# Patient Record
Sex: FEMALE | Race: BLACK OR AFRICAN AMERICAN | NOT HISPANIC OR LATINO | Employment: FULL TIME | URBAN - METROPOLITAN AREA
[De-identification: names, ages, dates, MRNs, and addresses within clinical notes are randomized per-mention and may not be internally consistent; named-entity substitution may affect disease eponyms.]

---

## 2024-04-06 ENCOUNTER — HOSPITAL ENCOUNTER (EMERGENCY)
Facility: HOSPITAL | Age: 34
Discharge: HOME | End: 2024-04-06
Attending: EMERGENCY MEDICINE
Payer: MEDICAID

## 2024-04-06 ENCOUNTER — APPOINTMENT (OUTPATIENT)
Dept: CARDIOLOGY | Facility: HOSPITAL | Age: 34
End: 2024-04-06
Payer: MEDICAID

## 2024-04-06 VITALS
SYSTOLIC BLOOD PRESSURE: 113 MMHG | DIASTOLIC BLOOD PRESSURE: 65 MMHG | HEART RATE: 85 BPM | TEMPERATURE: 98.4 F | RESPIRATION RATE: 16 BRPM | OXYGEN SATURATION: 99 %

## 2024-04-06 DIAGNOSIS — I89.0 LYMPHEDEMA: ICD-10-CM

## 2024-04-06 DIAGNOSIS — M79.652 LEFT THIGH PAIN: Primary | ICD-10-CM

## 2024-04-06 LAB
ANION GAP SERPL CALCULATED.3IONS-SCNC: 11.5 MMOL/L (ref 5–15)
BASOPHILS # BLD AUTO: 0.07 10*3/MM3 (ref 0–0.2)
BASOPHILS NFR BLD AUTO: 0.6 % (ref 0–1.5)
BH CV LOWER VASCULAR LEFT COMMON FEMORAL AUGMENT: NORMAL
BH CV LOWER VASCULAR LEFT COMMON FEMORAL COMPETENT: NORMAL
BH CV LOWER VASCULAR LEFT COMMON FEMORAL COMPRESS: NORMAL
BH CV LOWER VASCULAR LEFT COMMON FEMORAL PHASIC: NORMAL
BH CV LOWER VASCULAR LEFT COMMON FEMORAL SPONT: NORMAL
BH CV LOWER VASCULAR LEFT DISTAL FEMORAL COMPRESS: NORMAL
BH CV LOWER VASCULAR LEFT GASTRONEMIUS COMPRESS: NORMAL
BH CV LOWER VASCULAR LEFT GREATER SAPH AK COMPRESS: NORMAL
BH CV LOWER VASCULAR LEFT GREATER SAPH BK COMPRESS: NORMAL
BH CV LOWER VASCULAR LEFT LESSER SAPH COMPRESS: NORMAL
BH CV LOWER VASCULAR LEFT MID FEMORAL AUGMENT: NORMAL
BH CV LOWER VASCULAR LEFT MID FEMORAL COMPETENT: NORMAL
BH CV LOWER VASCULAR LEFT MID FEMORAL COMPRESS: NORMAL
BH CV LOWER VASCULAR LEFT MID FEMORAL PHASIC: NORMAL
BH CV LOWER VASCULAR LEFT MID FEMORAL SPONT: NORMAL
BH CV LOWER VASCULAR LEFT PERONEAL COMPRESS: NORMAL
BH CV LOWER VASCULAR LEFT POPLITEAL AUGMENT: NORMAL
BH CV LOWER VASCULAR LEFT POPLITEAL COMPETENT: NORMAL
BH CV LOWER VASCULAR LEFT POPLITEAL COMPRESS: NORMAL
BH CV LOWER VASCULAR LEFT POPLITEAL PHASIC: NORMAL
BH CV LOWER VASCULAR LEFT POPLITEAL SPONT: NORMAL
BH CV LOWER VASCULAR LEFT POSTERIOR TIBIAL COMPRESS: NORMAL
BH CV LOWER VASCULAR LEFT PROFUNDA FEMORAL COMPRESS: NORMAL
BH CV LOWER VASCULAR LEFT PROXIMAL FEMORAL COMPRESS: NORMAL
BH CV LOWER VASCULAR LEFT SAPHENOFEMORAL JUNCTION COMPRESS: NORMAL
BH CV LOWER VASCULAR RIGHT COMMON FEMORAL AUGMENT: NORMAL
BH CV LOWER VASCULAR RIGHT COMMON FEMORAL COMPETENT: NORMAL
BH CV LOWER VASCULAR RIGHT COMMON FEMORAL COMPRESS: NORMAL
BH CV LOWER VASCULAR RIGHT COMMON FEMORAL PHASIC: NORMAL
BH CV LOWER VASCULAR RIGHT COMMON FEMORAL SPONT: NORMAL
BH CV VAS PRELIMINARY FINDINGS SCRIPTING: 1
BUN SERPL-MCNC: 13 MG/DL (ref 6–20)
BUN/CREAT SERPL: 14.9 (ref 7–25)
CALCIUM SPEC-SCNC: 9 MG/DL (ref 8.6–10.5)
CHLORIDE SERPL-SCNC: 104 MMOL/L (ref 98–107)
CO2 SERPL-SCNC: 27.5 MMOL/L (ref 22–29)
CREAT SERPL-MCNC: 0.87 MG/DL (ref 0.57–1)
DEPRECATED RDW RBC AUTO: 42.2 FL (ref 37–54)
EGFRCR SERPLBLD CKD-EPI 2021: 89.8 ML/MIN/1.73
EOSINOPHIL # BLD AUTO: 0.34 10*3/MM3 (ref 0–0.4)
EOSINOPHIL NFR BLD AUTO: 2.9 % (ref 0.3–6.2)
ERYTHROCYTE [DISTWIDTH] IN BLOOD BY AUTOMATED COUNT: 14.2 % (ref 12.3–15.4)
GLUCOSE SERPL-MCNC: 180 MG/DL (ref 65–99)
HCT VFR BLD AUTO: 37.8 % (ref 34–46.6)
HGB BLD-MCNC: 11.8 G/DL (ref 12–15.9)
IMM GRANULOCYTES # BLD AUTO: 0.02 10*3/MM3 (ref 0–0.05)
IMM GRANULOCYTES NFR BLD AUTO: 0.2 % (ref 0–0.5)
LYMPHOCYTES # BLD AUTO: 5.08 10*3/MM3 (ref 0.7–3.1)
LYMPHOCYTES NFR BLD AUTO: 43.7 % (ref 19.6–45.3)
MAGNESIUM SERPL-MCNC: 2.1 MG/DL (ref 1.6–2.6)
MCH RBC QN AUTO: 25.8 PG (ref 26.6–33)
MCHC RBC AUTO-ENTMCNC: 31.2 G/DL (ref 31.5–35.7)
MCV RBC AUTO: 82.7 FL (ref 79–97)
MONOCYTES # BLD AUTO: 0.73 10*3/MM3 (ref 0.1–0.9)
MONOCYTES NFR BLD AUTO: 6.3 % (ref 5–12)
NEUTROPHILS NFR BLD AUTO: 46.3 % (ref 42.7–76)
NEUTROPHILS NFR BLD AUTO: 5.38 10*3/MM3 (ref 1.7–7)
NRBC BLD AUTO-RTO: 0 /100 WBC (ref 0–0.2)
PLATELET # BLD AUTO: 344 10*3/MM3 (ref 140–450)
PMV BLD AUTO: 10 FL (ref 6–12)
POTASSIUM SERPL-SCNC: 3.9 MMOL/L (ref 3.5–5.2)
RBC # BLD AUTO: 4.57 10*6/MM3 (ref 3.77–5.28)
SODIUM SERPL-SCNC: 143 MMOL/L (ref 136–145)
WBC NRBC COR # BLD AUTO: 11.62 10*3/MM3 (ref 3.4–10.8)

## 2024-04-06 PROCEDURE — 36415 COLL VENOUS BLD VENIPUNCTURE: CPT

## 2024-04-06 PROCEDURE — 80048 BASIC METABOLIC PNL TOTAL CA: CPT | Performed by: PHYSICIAN ASSISTANT

## 2024-04-06 PROCEDURE — 93971 EXTREMITY STUDY: CPT

## 2024-04-06 PROCEDURE — 83735 ASSAY OF MAGNESIUM: CPT | Performed by: PHYSICIAN ASSISTANT

## 2024-04-06 PROCEDURE — 93971 EXTREMITY STUDY: CPT | Performed by: SURGERY

## 2024-04-06 PROCEDURE — 85025 COMPLETE CBC W/AUTO DIFF WBC: CPT | Performed by: PHYSICIAN ASSISTANT

## 2024-04-06 PROCEDURE — 99284 EMERGENCY DEPT VISIT MOD MDM: CPT

## 2024-04-06 RX ORDER — ACETAMINOPHEN 500 MG
1000 TABLET ORAL ONCE
Status: COMPLETED | OUTPATIENT
Start: 2024-04-06 | End: 2024-04-06

## 2024-04-06 RX ADMIN — ACETAMINOPHEN 1000 MG: 500 TABLET ORAL at 09:43

## 2024-04-06 NOTE — ED PROVIDER NOTES
EMERGENCY DEPARTMENT ENCOUNTER  Room Number:  01/01  PCP: Ramirez Auguste MD  Independent Historians: Patient      HPI:  Chief Complaint: had concerns including Leg Pain.     A complete HPI/ROS/PMH/PSH/SH/FH are unobtainable due to: None    Chronic or social conditions impacting patient care (Social Determinants of Health): None      Context: Amberly Sanders is a 33 y.o. female with a medical history of lymphedema and obesity who presents to the ED c/o acute left thigh pain.  Patient reports for the last 3 days she has had cramping pain over the lateral left thigh.  States she has seen Dr. Govea with vascular surgery and told she has had superficial thrombophlebitis.  No history of DVT.  No injury or trauma to the leg.  Patient denies dyspnea, chest pain, right leg symptoms.  No other systemic complaints at this time.      Review of prior external notes (non-ED) -and- Review of prior external test results outside of this encounter: Extensive review of the EPIC system as well as [facility] reveals no prior visit notes and no prior diagnostic studies available for review.    Prescription drug monitoring program review:     N/A    PAST MEDICAL HISTORY  Active Ambulatory Problems     Diagnosis Date Noted    No Active Ambulatory Problems     Resolved Ambulatory Problems     Diagnosis Date Noted    No Resolved Ambulatory Problems     No Additional Past Medical History         PAST SURGICAL HISTORY  History reviewed. No pertinent surgical history.      FAMILY HISTORY  History reviewed. No pertinent family history.      SOCIAL HISTORY  Social History     Socioeconomic History    Marital status: Single         ALLERGIES  Sulfa antibiotics      REVIEW OF SYSTEMS  Review of Systems   Constitutional:  Negative for chills and fever.   HENT:  Negative for ear pain and sore throat.    Respiratory:  Negative for cough and shortness of breath.    Cardiovascular:  Negative for chest pain and palpitations.    Gastrointestinal:  Negative for abdominal pain and vomiting.   Genitourinary:  Negative for dysuria and hematuria.   Musculoskeletal:  Positive for myalgias. Negative for arthralgias and joint swelling.   Skin:  Negative for pallor and rash.   Neurological:  Negative for numbness and headaches.   Psychiatric/Behavioral:  Negative for confusion and hallucinations.      Included in HPI  All systems reviewed and negative except for those discussed in HPI.      PHYSICAL EXAM    I have reviewed the triage vital signs and nursing notes.    ED Triage Vitals [04/06/24 0718]   Temp Heart Rate Resp BP SpO2   98.4 °F (36.9 °C) 112 16 -- 97 %      Temp src Heart Rate Source Patient Position BP Location FiO2 (%)   Tympanic Monitor -- -- --       Physical Exam  Constitutional:       General: She is not in acute distress.     Appearance: She is well-developed.   HENT:      Head: Normocephalic and atraumatic.   Eyes:      Extraocular Movements: Extraocular movements intact.   Cardiovascular:      Rate and Rhythm: Normal rate and regular rhythm.      Pulses:           Dorsalis pedis pulses are 2+ on the right side and 2+ on the left side.      Heart sounds: Normal heart sounds.   Pulmonary:      Effort: Pulmonary effort is normal.      Breath sounds: Normal breath sounds.   Abdominal:      General: There is no distension.   Musculoskeletal:      Comments: Patient has some tenderness along the distal iliotibial band of the left leg.  Very mild calf tenderness.  No crepitus.   Skin:     General: Skin is warm.   Neurological:      General: No focal deficit present.      Mental Status: She is alert and oriented to person, place, and time.   Psychiatric:         Mood and Affect: Mood normal.           LAB RESULTS  Recent Results (from the past 24 hour(s))   Basic Metabolic Panel    Collection Time: 04/06/24  7:33 AM    Specimen: Blood   Result Value Ref Range    Glucose 180 (H) 65 - 99 mg/dL    BUN 13 6 - 20 mg/dL    Creatinine 0.87  0.57 - 1.00 mg/dL    Sodium 143 136 - 145 mmol/L    Potassium 3.9 3.5 - 5.2 mmol/L    Chloride 104 98 - 107 mmol/L    CO2 27.5 22.0 - 29.0 mmol/L    Calcium 9.0 8.6 - 10.5 mg/dL    BUN/Creatinine Ratio 14.9 7.0 - 25.0    Anion Gap 11.5 5.0 - 15.0 mmol/L    eGFR 89.8 >60.0 mL/min/1.73   Magnesium    Collection Time: 04/06/24  7:33 AM    Specimen: Blood   Result Value Ref Range    Magnesium 2.1 1.6 - 2.6 mg/dL   CBC Auto Differential    Collection Time: 04/06/24  7:33 AM    Specimen: Blood   Result Value Ref Range    WBC 11.62 (H) 3.40 - 10.80 10*3/mm3    RBC 4.57 3.77 - 5.28 10*6/mm3    Hemoglobin 11.8 (L) 12.0 - 15.9 g/dL    Hematocrit 37.8 34.0 - 46.6 %    MCV 82.7 79.0 - 97.0 fL    MCH 25.8 (L) 26.6 - 33.0 pg    MCHC 31.2 (L) 31.5 - 35.7 g/dL    RDW 14.2 12.3 - 15.4 %    RDW-SD 42.2 37.0 - 54.0 fl    MPV 10.0 6.0 - 12.0 fL    Platelets 344 140 - 450 10*3/mm3    Neutrophil % 46.3 42.7 - 76.0 %    Lymphocyte % 43.7 19.6 - 45.3 %    Monocyte % 6.3 5.0 - 12.0 %    Eosinophil % 2.9 0.3 - 6.2 %    Basophil % 0.6 0.0 - 1.5 %    Immature Grans % 0.2 0.0 - 0.5 %    Neutrophils, Absolute 5.38 1.70 - 7.00 10*3/mm3    Lymphocytes, Absolute 5.08 (H) 0.70 - 3.10 10*3/mm3    Monocytes, Absolute 0.73 0.10 - 0.90 10*3/mm3    Eosinophils, Absolute 0.34 0.00 - 0.40 10*3/mm3    Basophils, Absolute 0.07 0.00 - 0.20 10*3/mm3    Immature Grans, Absolute 0.02 0.00 - 0.05 10*3/mm3    nRBC 0.0 0.0 - 0.2 /100 WBC   Duplex Venous Lower Extremity - LEFT    Collection Time: 04/06/24  9:28 AM   Result Value Ref Range    Right Common Femoral Spont Y     Right Common Femoral Competent Y     Right Common Femoral Phasic Y     Right Common Femoral Compress C     Right Common Femoral Augment Y     Left Common Femoral Spont Y     Left Common Femoral Competent Y     Left Common Femoral Phasic Y     Left Common Femoral Compress C     Left Common Femoral Augment Y     Left Saphenofemoral Junction Compress C     Left Profunda Femoral Compress C     Left  Proximal Femoral Compress C     Left Mid Femoral Spont Y     Left Mid Femoral Competent Y     Left Mid Femoral Phasic Y     Left Mid Femoral Compress C     Left Mid Femoral Augment Y     Left Distal Femoral Compress C     Left Popliteal Spont Y     Left Popliteal Competent Y     Left Popliteal Phasic Y     Left Popliteal Compress C     Left Popliteal Augment Y     Left Posterior Tibial Compress C     Left Peroneal Compress C     Left Gastronemius Compress C     Left Greater Saph AK Compress C     Left Greater Saph BK Compress C     Left Lesser Saph Compress C     BH CV VAS PRELIMINARY FINDINGS SCRIPTING 1.0          MEDICATIONS GIVEN IN ER  Medications   acetaminophen (TYLENOL) tablet 1,000 mg (1,000 mg Oral Given 4/6/24 0943)         ORDERS PLACED DURING THIS VISIT:  Orders Placed This Encounter   Procedures    Basic Metabolic Panel    Magnesium    CBC Auto Differential    CBC & Differential         OUTPATIENT MEDICATION MANAGEMENT:  No current Epic-ordered facility-administered medications on file.     Current Outpatient Medications Ordered in Epic   Medication Sig Dispense Refill    Diclofenac Sodium (VOLTAREN) 1 % gel gel Apply 4 g topically to the appropriate area as directed 4 (Four) Times a Day As Needed (Pain). 100 g 0             PROGRESS, DATA ANALYSIS, CONSULTS, AND MEDICAL DECISION MAKING  All labs have been independently interpreted by me.  All radiology studies have been reviewed by me. All EKG's have been independently viewed and interpreted by me.  Discussion below represents my analysis of pertinent findings related to patient's condition, differential diagnosis, treatment plan and final disposition.    Differential diagnosis includes but is not limited to DVT, popliteal cyst, superficial thrombophlebitis, IT band syndrome.        ED Course as of 04/06/24 1013   Sat Apr 06, 2024   0816 Glucose(!): 180 [WH]   0817 BUN: 13 [WH]   0817 Creatinine: 0.87 [WH]   0817 Magnesium: 2.1 [WH]   0817 WBC(!):  11.62 []   0817 Hemoglobin(!): 11.8 []   0928 Preliminary report per ultrasound tech is negative for DVT in the LLE. [MP]   1012 Patient presents to emergency department with left thigh pain.  Worked up with labs and venous duplex of left lower extremity.  No evidence of DVT.  Suspect muscular etiology.  Patient given dose of Tylenol here in the ED.  Will have her follow-up with PCP and discussed ED return precautions.  She is otherwise well-appearing, hemodynamically stable, and therefore appropriate for discharge. [MP]      ED Course User Index  [MP] Lina Wall PA-C  [] Eduardo Jimenez MD             AS OF 10:12 EDT VITALS:    BP - 113/65  HR - 85  TEMP - 98.4 °F (36.9 °C) (Tympanic)  O2 SATS - 99%    COMPLEXITY OF CARE  Admission was considered but after careful review of the patient's presentation, physical examination, diagnostic results, and response to treatment the patient may be safely discharged with outpatient follow-up.      DIAGNOSIS  Final diagnoses:   Left thigh pain   Lymphedema         DISPOSITION  ED Disposition       ED Disposition   Discharge    Condition   Stable    Comment   --                Please note that portions of this document were completed with a voice recognition program.    Note Disclaimer: At [facility], we believe that sharing information builds trust and better relationships. You are receiving this note because you recently visited [facility]. It is possible you will see health information before a provider has talked with you about it. This kind of information can be easy to misunderstand. To help you fully understand what it means for your health, we urge you to discuss this note with your provider.         Lina Wall PA-C  04/06/24 1013

## 2024-04-06 NOTE — ED PROVIDER NOTES
MD ATTESTATION NOTE     SHARED VISIT: This visit was performed by BOTH a physician and an APC. The substantive portion of the medical decision making was performed by this attesting physician who made or approved the management plan and takes responsibility for patient management. All studies in the APC note (if performed) were independently interpreted by me.  The K[facility] and I have discussed this patient's history, physical exam, and treatment plan. I have reviewed the documentation and personally had a face to face interaction with the patient. I affirm the documentation and agree with the treatment and plan. I provided a substantive portion of the care of the patient.  I personally performed the physical exam in its entirety, and below are my findings.      Brief HPI: Patient complains of acute left calf and thigh pain for the past several days.  She has a history of lymphedema and has some chronic discomfort in her left calf..  She denies recent injury, chest pain, shortness of breath, or numbness/tingling/weakness in her extremities.    PHYSICAL EXAM  ED Triage Vitals   Temp Heart Rate Resp BP SpO2   04/06/24 0718 04/06/24 0718 04/06/24 0718 04/06/24 0727 04/06/24 0718   98.4 °F (36.9 °C) 112 16 128/88 97 %      Temp src Heart Rate Source Patient Position BP Location FiO2 (%)   04/06/24 0718 04/06/24 0718 04/06/24 0727 04/06/24 0727 --   Tympanic Monitor Sitting Right arm          GENERAL: Awake, alert, oriented x 3.  No acute distress  HENT: nares patent  EYES: no scleral icterus  CV: regular rhythm, normal rate, equal pedal pulses bilaterally  RESPIRATORY: normal effort, clear to auscultation bilaterally  ABDOMEN: soft, nontender  MUSCULOSKELETAL: There is tenderness along the lateral left thigh and left calf.  There is bilateral pedal edema.  NEURO: Normal strength and light touch sensation in both lower extremities.  PSYCH:  calm, cooperative  SKIN: warm, dry    Vital signs and nursing notes  reviewed.        Plan: Obtain labs and venous Doppler of the left leg.    Doppler ultrasound is negative for DVT.    Patient presented to the ED complaining of left leg pain.  Ultrasound was negative for DVT.  White blood cell count was mildly elevated but she did not have any evidence of cellulitis.  Pain is likely musculoskeletal.  Plan is for symptomatic treatment.    ED Course as of 04/06/24 1128   Sat Apr 06, 2024   0816 Glucose(!): 180 [WH]   0817 BUN: 13 [WH]   0817 Creatinine: 0.87 [WH]   0817 Magnesium: 2.1 [WH]   0817 WBC(!): 11.62 [WH]   0817 Hemoglobin(!): 11.8 [WH]   0928 Preliminary report per ultrasound tech is negative for DVT in the LLE. [MP]   1012 Patient presents to emergency department with left thigh pain.  Worked up with labs and venous duplex of left lower extremity.  No evidence of DVT.  Suspect muscular etiology.  Patient given dose of Tylenol here in the ED.  Will have her follow-up with PCP and discussed ED return precautions.  She is otherwise well-appearing, hemodynamically stable, and therefore appropriate for discharge. [MP]      ED Course User Index  [MP] Lina Wall PA-C  [] Eduardo Jimenez MD Holland, William D, MD  04/06/24 1129

## 2024-04-06 NOTE — DISCHARGE INSTRUCTIONS
Follow up with PCP.   Use tylenol or voltaren gel to help with pain.  Return to ED for any worsening symptoms.

## 2024-04-19 ENCOUNTER — HOSPITAL ENCOUNTER (EMERGENCY)
Facility: HOSPITAL | Age: 34
Discharge: HOME | End: 2024-04-19
Attending: EMERGENCY MEDICINE
Payer: COMMERCIAL

## 2024-04-19 ENCOUNTER — APPOINTMENT (OUTPATIENT)
Dept: CT IMAGING | Facility: HOSPITAL | Age: 34
End: 2024-04-19
Payer: COMMERCIAL

## 2024-04-19 VITALS
HEART RATE: 70 BPM | OXYGEN SATURATION: 96 % | TEMPERATURE: 98.3 F | SYSTOLIC BLOOD PRESSURE: 107 MMHG | RESPIRATION RATE: 20 BRPM | DIASTOLIC BLOOD PRESSURE: 67 MMHG

## 2024-04-19 DIAGNOSIS — M54.40 BACK PAIN OF LUMBAR REGION WITH SCIATICA: Primary | ICD-10-CM

## 2024-04-19 LAB — B-HCG UR QL: NEGATIVE

## 2024-04-19 PROCEDURE — 25010000002 KETOROLAC TROMETHAMINE PER 15 MG: Performed by: EMERGENCY MEDICINE

## 2024-04-19 PROCEDURE — 99284 EMERGENCY DEPT VISIT MOD MDM: CPT

## 2024-04-19 PROCEDURE — 72131 CT LUMBAR SPINE W/O DYE: CPT

## 2024-04-19 PROCEDURE — 96372 THER/PROPH/DIAG INJ SC/IM: CPT

## 2024-04-19 PROCEDURE — 81025 URINE PREGNANCY TEST: CPT | Performed by: EMERGENCY MEDICINE

## 2024-04-19 PROCEDURE — 63710000001 ONDANSETRON ODT 4 MG TABLET DISPERSIBLE: Performed by: EMERGENCY MEDICINE

## 2024-04-19 RX ORDER — METHOCARBAMOL 500 MG/1
500 TABLET, FILM COATED ORAL 2 TIMES DAILY PRN
Qty: 15 TABLET | Refills: 0 | Status: SHIPPED | OUTPATIENT
Start: 2024-04-19 | End: 2024-04-26

## 2024-04-19 RX ORDER — CYCLOBENZAPRINE HCL 10 MG
10 TABLET ORAL ONCE
Status: COMPLETED | OUTPATIENT
Start: 2024-04-19 | End: 2024-04-19

## 2024-04-19 RX ORDER — ONDANSETRON 4 MG/1
4 TABLET, ORALLY DISINTEGRATING ORAL ONCE
Status: COMPLETED | OUTPATIENT
Start: 2024-04-19 | End: 2024-04-19

## 2024-04-19 RX ORDER — MEDROXYPROGESTERONE ACETATE 10 MG/1
TABLET ORAL
COMMUNITY
Start: 2024-02-11

## 2024-04-19 RX ORDER — HYDROCHLOROTHIAZIDE 25 MG/1
TABLET ORAL
COMMUNITY
Start: 2024-01-11

## 2024-04-19 RX ORDER — KETOROLAC TROMETHAMINE 30 MG/ML
30 INJECTION, SOLUTION INTRAMUSCULAR; INTRAVENOUS ONCE
Status: COMPLETED | OUTPATIENT
Start: 2024-04-19 | End: 2024-04-19

## 2024-04-19 RX ORDER — ATORVASTATIN CALCIUM 20 MG/1
20 TABLET, FILM COATED ORAL NIGHTLY
COMMUNITY

## 2024-04-19 RX ORDER — LISINOPRIL 30 MG/1
TABLET ORAL
COMMUNITY
Start: 2024-01-21

## 2024-04-19 RX ORDER — ERGOCALCIFEROL 1.25 MG/1
CAPSULE ORAL
COMMUNITY
Start: 2024-02-11

## 2024-04-19 RX ORDER — KETOROLAC TROMETHAMINE 15 MG/ML
15 INJECTION, SOLUTION INTRAMUSCULAR; INTRAVENOUS ONCE
Status: DISCONTINUED | OUTPATIENT
Start: 2024-04-19 | End: 2024-04-19

## 2024-04-19 RX ORDER — METHYLPREDNISOLONE 4 MG/1
TABLET ORAL
Qty: 21 TABLET | Refills: 0 | Status: SHIPPED | OUTPATIENT
Start: 2024-04-19

## 2024-04-19 RX ORDER — PEN NEEDLE, DIABETIC 31 GX5/16"
NEEDLE, DISPOSABLE MISCELLANEOUS
COMMUNITY
Start: 2024-01-11

## 2024-04-19 RX ADMIN — CYCLOBENZAPRINE 10 MG: 10 TABLET, FILM COATED ORAL at 18:35

## 2024-04-19 RX ADMIN — KETOROLAC TROMETHAMINE 30 MG: 30 INJECTION, SOLUTION INTRAMUSCULAR at 18:35

## 2024-04-19 RX ADMIN — ONDANSETRON 4 MG: 4 TABLET, ORALLY DISINTEGRATING ORAL at 19:56

## 2024-04-19 NOTE — ED NOTES
Patient arrives via EMS from [facility] with back pain that radiates to right leg. 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

## 2024-04-19 NOTE — ED PROVIDER NOTES
EMERGENCY DEPARTMENT ENCOUNTER  Room Number:  35/35  PCP: Ramirez Auguste MD  Independent Historians: Patient and EMS      HPI:  Chief Complaint: had concerns including Back Pain and Leg Pain.     A complete HPI/ROS/PMH/PSH/SH/FH are unobtainable due to: None    Chronic or social conditions impacting patient care (Social Determinants of Health): None      Context: Amberly Sanders is a 33 y.o. 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  Yesterday she was helping a patient transfer out of bed and she had sudden strain in her low abdomen.  This morning when she woke up she had new pain in the low back that radiates into the right leg and has made it difficult for her to get up and walk or move around to do her usual daily activities.  She denies any bowel or bladder incontinence.  She denies any sensory changes.      Review of prior external notes (non-ED) -and- Review of prior external test results outside of this encounter: Extensive review of the EPIC system as well as [facility] reveals no prior visit notes and no prior diagnostic studies available for review.    PAST MEDICAL HISTORY  Active Ambulatory Problems     Diagnosis Date Noted    No Active Ambulatory Problems     Resolved Ambulatory Problems     Diagnosis Date Noted    No Resolved Ambulatory Problems     Past Medical History:   Diagnosis Date    Diabetes mellitus     Hyperlipidemia     Hypertension     Lymphedema     PCOS (polycystic ovarian syndrome)          PAST SURGICAL HISTORY  History reviewed. No pertinent surgical history.      FAMILY HISTORY  History reviewed. No pertinent family history.      SOCIAL HISTORY  Social History     Socioeconomic History    Marital status: Single   Tobacco Use    Smoking status: Never    Smokeless tobacco: Never   Substance and Sexual  "Activity    Alcohol use: Yes     Comment: \"a drink a week\"    Drug use: Never         ALLERGIES  Sulfa antibiotics      REVIEW OF SYSTEMS  Review of Systems  Included in HPI  All systems reviewed and negative except for those discussed in HPI.      PHYSICAL EXAM    I have reviewed the triage vital signs and nursing notes.    ED Triage Vitals [04/19/24 1725]   Temp Heart Rate Resp BP SpO2   98.3 °F (36.8 °C) 80 21 123/89 97 %      Temp src Heart Rate Source Patient Position BP Location FiO2 (%)   -- -- -- -- --       Physical Exam  GENERAL: alert, no acute distress  SKIN: Warm, dry  HENT: Normocephalic, atraumatic  EYES: no scleral icterus, normal conjunctiva  CV: regular rhythm, regular rate  RESPIRATORY: normal effort, lungs clear bilaterally  ABDOMEN: soft, nondistended, obese, mild tenderness across the lower quadrants equally but no peritoneal features elicited.  MUSCULOSKELETAL: no deformity, trace edema to the bilateral lower extremities symmetrically.  NEURO: alert, moves all extremities, follows commands, no focal motor or sensory deficits are observed.      LAB RESULTS  Recent Results (from the past 24 hour(s))   Pregnancy, Urine - Urine, Clean Catch    Collection Time: 04/19/24  7:16 PM    Specimen: Urine, Clean Catch   Result Value Ref Range    HCG, Urine QL Negative Negative         RADIOLOGY  CT Lumbar Spine Without Contrast    Result Date: 4/19/2024  CT SCAN OF THE LUMBAR SPINE WITHOUT CONTRAST  CLINICAL HISTORY: Low back pain.  TECHNIQUE: CT scan of the lumbar spine was obtained with a combination of 3 , 2, and 1 mm axial images. Bone and soft tissue algorithm images were obtained with sagittal and coronal reconstructed images.  FINDINGS:  There is A mild dextroconvex scoliotic curvature of the thoracolumbar junction.  The visualization of the spinal canal is markedly limited due to technical factors concerning patient body habitus.  At L1-2 and L2-3, there is no convincing significant canal or " foraminal stenosis.  At L3-4, there is a mild disc bulge in addition to a small left central disc protrusion mildly indenting the ventral subarachnoid space. There is no significant degree of foraminal compromise.  At L4-5, there is mild to moderate facet arthropathy. There is a disc bulge eccentric to the left. There is a question of a conjoined left L4 and L5 nerve root and the bulging disc material may contact the left L5 nerve root. Further evaluation with MR imaging is suggested.  At L5-S1, there are advanced degenerative disc changes. There is a focal osteophyte within the left subarticular region which contacts the descending left S1 nerve root sleeve.       The study is limited by technical concerns regarding patient body habitus.  At L5-S1, there is a relatively focal osteophyte within the left subarticular region which abuts the descending left S1 nerve root sleeve.  At the L4-5 level, there is a disc bulge eccentric to the left and this may abut the left L5 nerve root sleeve. Further characterization with MR imaging is suggested.  The remaining degenerative phenomena are as discussed above.  Mild dextroconvex scoliotic curvature of the thoracolumbar junction is seen.   Radiation dose reduction techniques were utilized, including automated exposure control and exposure modulation based on body size.  This report was finalized on 4/19/2024 9:21 PM by Dr. Cresencio Mendoza M.D on Workstation: [id number]         MEDICATIONS GIVEN IN ER  Medications   cyclobenzaprine (FLEXERIL) tablet 10 mg (10 mg Oral Given 4/19/24 1835)   ketorolac (TORADOL) injection 30 mg (30 mg Intramuscular Given 4/19/24 1835)   ondansetron ODT (ZOFRAN-ODT) disintegrating tablet 4 mg (4 mg Oral Given 4/19/24 1956)         ORDERS PLACED DURING THIS VISIT:  Orders Placed This Encounter   Procedures    CT Lumbar Spine Without Contrast    Pregnancy, Urine - Urine, Clean Catch    Ambulatory Referral to Neurosurgery (All except [place])          OUTPATIENT MEDICATION MANAGEMENT:  No current Epic-ordered facility-administered medications on file.     Current Outpatient Medications Ordered in Epic   Medication Sig Dispense Refill    atorvastatin (LIPITOR) 20 MG tablet Take 1 tablet by mouth Every Night.      B-D ULTRAFINE III SHORT PEN 31G X 8 MM misc       hydroCHLOROthiazide 25 MG tablet       lisinopril (PRINIVIL,ZESTRIL) 30 MG tablet       medroxyPROGESTERone (PROVERA) 10 MG tablet       Semaglutide,0.25 or 0.5MG/DOS, (OZEMPIC) 2 MG/1.5ML solution pen-injector Inject 0.5 mg under the skin into the appropriate area as directed 1 (One) Time Per Week.      vitamin D (ERGOCALCIFEROL) 1.25 MG (08500 UT) capsule capsule       Diclofenac Sodium (VOLTAREN) 1 % gel gel Apply 4 g topically to the appropriate area as directed 4 (Four) Times a Day As Needed (Pain). 100 g 0    methocarbamol (ROBAXIN) 500 MG tablet Take 1 tablet by mouth 2 (Two) Times a Day As Needed for Muscle Spasms for up to 7 days. 15 tablet 0    methylPREDNISolone (MEDROL) 4 MG dose pack Take as directed on package instructions. 21 tablet 0         PROCEDURES  Procedures            PROGRESS, DATA ANALYSIS, CONSULTS, AND MEDICAL DECISION MAKING  All labs have been independently interpreted by me.  All radiology studies have been reviewed by me. All EKG's have been independently viewed and interpreted by me.  Discussion below represents my analysis of pertinent findings related to patient's condition, differential diagnosis, treatment plan and final disposition.    Differential diagnosis includes but is not limited to lumbar spine fracture, disc herniation, lumbago, sciatica, abdominal strain.    Clinical Scores:                   ED Course as of 04/19/24 2337   Fri Apr 19, 2024 1815 Ordering CT lumbosacral spine for radiographic evaluation of her area of tenderness.  Suspect she may have some disc herniation component given the radicular symptoms described. [MAYDA]   2335 I independently  "interpreted the CT lumbar spine without contrast study and my findings are: No fracture, no subluxation [MAYDA]   8961 I discussed the CT findings with the patient at the bedside.  She is feeling a little better here after some anti-inflammatory muscle relaxer medication.  There are no red flags in her history or physical exam findings to suggest an acute neurosurgical emergency problem.  I think she is suitable for discharge home and outpatient follow-up with a spine specialist.  I explained that she may need to have an outpatient MRI of the lumbar spine for further evaluation of her condition.  She agrees to the plan as outlined.  Will give her resource information to contact the spine surgery office.  Will also discuss with her the usual \"return to ER\" instructions prior to discharge. [MAYDA]      ED Course User Index  [MAYDA] Arie Rubin MD           AS OF 23:37 EDT VITALS:    BP - 107/67  HR - 70  TEMP - 98.3 °F (36.8 °C)  O2 SATS - 96%    COMPLEXITY OF CARE  Admission was considered but after careful review of the patient's presentation, physical examination, diagnostic results, and response to treatment the patient may be safely discharged with outpatient follow-up.      DIAGNOSIS  Final diagnoses:   Back pain of lumbar region with sciatica         DISPOSITION  ED Disposition       ED Disposition   Discharge    Condition   Stable    Comment   --                Please note that portions of this document were completed with a voice recognition program.    Note Disclaimer: At [facility], we believe that sharing information builds trust and better relationships. You are receiving this note because you recently visited [facility]. It is possible you will see health information before a provider has talked with you about it. This kind of information can be easy to misunderstand. To help you fully understand what it means for your health, we urge you to discuss this note with your provider.         Arie Rubin " MD DAPHNIE  04/19/24 7046

## 2024-04-19 NOTE — Clinical Note
[facility] EMERGENCY DEPARTMENT  [address] [facility] [address]  Phone: [phone]    Amberly Sanders was seen and treated in our emergency department on 4/19/2024.  She may return to work on 04/23/2024.         Thank you for choosing [facility].    Arie Rubin MD

## 2024-04-20 NOTE — DISCHARGE INSTRUCTIONS
May apply heating pad to the low back and abdomen areas as needed.  Please return to the emergency room for worsening pain, fevers, weakness, numbness, bowel or bladder incontinence or any other concerns.  Recommend follow-up with spine surgery specialist for further testing as we discussed.

## 2024-04-23 ENCOUNTER — TRANSCRIBE ORDERS (OUTPATIENT)
Dept: PHYSICAL THERAPY | Facility: HOSPITAL | Age: 34
End: 2024-04-23
Payer: MEDICAID

## 2024-04-23 DIAGNOSIS — S33.5XXA SPRAIN OF LIGAMENTS OF LUMBAR SPINE, INITIAL ENCOUNTER: Primary | ICD-10-CM

## 2024-04-23 DIAGNOSIS — M54.41 RIGHT-SIDED LOW BACK PAIN WITH RIGHT-SIDED SCIATICA, UNSPECIFIED CHRONICITY: ICD-10-CM

## 2024-04-23 DIAGNOSIS — M54.42 LEFT-SIDED LOW BACK PAIN WITH LEFT-SIDED SCIATICA, UNSPECIFIED CHRONICITY: ICD-10-CM

## 2024-04-23 DIAGNOSIS — S33.6XXA SPRAIN OF SACROILIAC JOINT, INITIAL ENCOUNTER: ICD-10-CM
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
## 2024-04-23 DIAGNOSIS — M51.26 OTHER INTERVERTEBRAL DISC DISPLACEMENT, LUMBAR REGION: ICD-10-CM
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
## 2024-04-25 ENCOUNTER — HOSPITAL ENCOUNTER (OUTPATIENT)
Dept: PHYSICAL THERAPY | Facility: HOSPITAL | Age: 34
Setting detail: THERAPIES SERIES
Discharge: HOME | End: 2024-04-25
Payer: COMMERCIAL

## 2024-04-25 DIAGNOSIS — M54.41 ACUTE BILATERAL LOW BACK PAIN WITH BILATERAL SCIATICA: ICD-10-CM

## 2024-04-25 DIAGNOSIS — S39.012S LUMBAR STRAIN, SEQUELA: Primary | ICD-10-CM
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
## 2024-04-25 DIAGNOSIS — M54.42 ACUTE BILATERAL LOW BACK PAIN WITH BILATERAL SCIATICA: ICD-10-CM

## 2024-04-25 PROCEDURE — 97110 THERAPEUTIC EXERCISES: CPT

## 2024-04-25 PROCEDURE — 97163 PT EVAL HIGH COMPLEX 45 MIN: CPT

## 2024-04-25 NOTE — THERAPY EVALUATION
"  Outpatient Physical Therapy Ortho Initial Evaluation  [facility]     Patient Name: Amberly Sanders  : 1990  MRN: [id number]  Today's Date: 2024      Visit Date: 2024    There is no problem list on file for this patient.       Past Medical History:   Diagnosis Date    Diabetes mellitus     Hyperlipidemia     Hypertension     Lymphedema     PCOS (polycystic ovarian syndrome)         No past surgical history on file.    Visit Dx:     ICD-10-CM ICD-9-CM   1. Lumbar strain, sequela  S39.012S 905.7   2. Acute bilateral low back pain with bilateral sciatica  M54.42 724.2    M54.41 724.3   3. Injury caused by lifting, sequela  X50.0XXS 909.4          Patient History       Row Name 24 0700             History    Chief Complaint Pain  -DR      Type of Pain Back pain  -DR      Date Current Problem(s) Began 24  -      Brief Description of Current Complaint Amberly Sanders is a 33 y.o. female who presents to physical therapy today with primary compliant of acute lumbar strain that happened 24. VINICIUS being helping a patient transfer out of bed with assistance, orginially feeling it in abdomen, but now feeling it in lumbar region and radiates into R LE and upwards of entire back. She describes the pain as pulsating pressure, pulling pain; constant pain at 6/10. She went to ER day after injury, they completed a CT scan showing disc bulge eccentric to the L at L5 nerve root. She admits to bladder changes, stating she had an incontinence episode this AM and feels she has increased urge frequency. Reports a few times she has noticed \"tripping\" and L foot dragging intermittently when walking. No falls. Amberly Sanders reports difficulty/increased pain with sitting on hard surface, standing 10 min, sitting 15-30 minutes, bending. Pain relieving factors include repositioning, a more calm environment, steroids. 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 No turning at the waist and no bending to  at the waist while sitting. PMH includes HTN, diabetes, PCOS, HLD. Amberly Sanders primary goal for attending skilled physical therapy is to return to work and functional mobility with no pain.  -DR      Previous treatment for THIS PROBLEM Other (comment)  none  -DR      Patient/Caregiver Goals Relieve pain;Know what to do to help the symptoms  -DR      Hand Dominance right-handed  -DR      Occupation/sports/leisure activities RN at [employer]  -DR      What clinical tests have you had for this problem? CT scan  -DR      Results of Clinical Tests At L5-S1, there is a relatively focal osteophyte within the left  subarticular region which abuts the descending left S1 nerve root  sleeve. At the L4-5 level, there is a disc bulge eccentric to the left and this  may abut the left L5 nerve root sleeve.  -DR         Pain     Pain Location Back  -DR      Pain at Present 6  -DR      Pain at Best 4  -DR      Pain at Worst 10  -DR      Pain Frequency Constant/continuous  -DR      Pain Description Pressure;Other (Comment);Burning  pulsating pressure, pulling pain  -DR      What Performance Factors Make the Current Problem(s) WORSE? sitting on hard surface, standing 10 min, sitting 15-30 minutes, bending  -DR      What Performance Factors Make the Current Problem(s) BETTER? repositioning, a more calm environment, steroids, tylenol  -DR      Tolerance Time- Standing 10 min  -DR      Tolerance Time- Sitting 15-30 min  -DR      Is your sleep disturbed? Yes  at least 2x/night  -DR      What position do you sleep in? Right sidelying;Left sidelying;Prone;Supine  -DR      Difficulties at work? on modified duty  -DR      Difficulties with ADL's? toileting- bending is problematic  -DR         Fall Risk Assessment    Any falls in the past year: No  -DR         Services    Prior Rehab/Home Health Experiences No  -DR         Daily Activities    Primary Language  English  -DR      How does patient learn best? Demonstration;Pictures/Video;Listening;Reading  -DR      Barriers to learning None  -DR      Pt Participated in POC and Goals Yes  -DR         Safety    Are you being hurt, hit, or frightened by anyone at home or in your life? No  -DR      Are you being neglected by a caregiver No  -DR      Have you had any of the following issues with N/A  -DR                User Key  (r) = Recorded By, (t) = Taken By, (c) = Cosigned By      Initials Name Provider Type    Shoaib Giraldo, PT Physical Therapist                     PT Ortho       Row Name 04/25/24 0700       Posture/Observations    Alignment Options Forward head;Rounded shoulders;Lumbar lordosis  -DR    Forward Head Mild  -DR    Rounded Shoulders Mild  -DR    Lumbar lordosis Increased  -DR       Quarter Clearing    Quarter Clearing Lower Quarter Clearing  -DR       DTR- Lower Quarter Clearing    Patellar tendon (L2-4) Unable to assess  increased BMI limiting true assessment  -DR    Achilles tendon (S1-2) Unable to assess  increased BMI limiting true assessment  -DR       Neural Tension Signs- Lower Quarter Clearing    Slump Bilateral:;Positive  -DR    SLR Bilateral:;Positive  -DR       Sensory Screen for Light Touch- Lower Quarter Clearing    L1 (inguinal area) Bilateral:;Intact  -DR    L2 (anterior mid thigh) Bilateral:;Intact  -DR    L3 (distal anterior thigh) Right:;Diminished  -DR    L4 (medial lower leg/foot) Right:;Diminished  -DR    L5 (lateral lower leg/great toe) Right:;Diminished  -DR    S1 (bottom of foot) Right:;Diminished  -DR       Myotomal Screen- Lower Quarter Clearing    Hip flexion (L2) Left:;4- (Good -);Right:;4 (Good)  L minimal resistance caused shooting pains; R shooting to hip  -DR    Knee extension (L3) Bilateral:;4- (Good -)  increased pain  -DR    Ankle DF (L4) Left:;5 (Normal);Right:;4- (Good -)  -DR    Knee flexion (S2) Bilateral:;4- (Good -)  -DR       Lumbar ROM Screen- Lower Quarter  Clearing    Lumbar Flexion Impaired  15% WNL  -DR    Lumbar Extension Unable to perform  pain  -DR    Lumbar Lateral Flexion Impaired  50% to the R, 25% to the L  -DR    Lumbar Rotation Impaired  10% WNL bilat  -DR       Special Tests/Palpation    Special Tests/Palpation Lumbar/SI  -DR       MMT (Manual Muscle Testing)    Rt Lower Ext Rt Hip ABduction  -DR    Lt Lower Ext Lt Hip ABduction  -DR       MMT Right Lower Ext    Rt Lower Extremity Comments  unable to assess d/t pain in position  -DR       MMT Left Lower Ext    Lt Lower Extremity Comments  unable to assess d/t pain in position  -DR       Flexibility    Flexibility Tested? Lower Extremity  -DR              User Key  (r) = Recorded By, (t) = Taken By, (c) = Cosigned By      Initials Name Provider Type    Shoaib Giraldo, PT Physical Therapist                                Therapy Education  Education Details: Educated on PT role and POC; discussed expectations/timeframe for healing. Educated pt on red flag symptoms and to go to ER if incontinence, balance, or other abnormal symptoms occur or worsen. Discussed calling PCP today to inform MD of new onset of incontinence. Discussed job modifications, repositioning, movement to avoid stiffness. Provided HEP, Access Code: [email or web address]  Given: HEP, Symptoms/condition management, Pain management, Posture/body mechanics, Fall prevention and home safety, Mobility training  Program: New  How Provided: Verbal, Demonstration, Written  Provided to: Patient  Level of Understanding: Teach back education performed, Verbalized, Demonstrated      PT OP Goals       Row Name 04/25/24 0700          PT Short Term Goals    STG Date to Achieve 05/25/24  -     STG 1 Pt. will be independent with initial HEP to improve self-management of condition.  -     STG 1 Progress New  -     STG 2 Patient will demonstrate proper log roll mechanics with little to no cues when getting on/off treatment table to demonstrate improved mechanics  and decreased strain on lumbar spine.  -     STG 2 Progress New  -     STG 3 Pt will improve lumbar ROM with </=2/10 pain in all cardinal planes for improved mobility and participation in ADLs.  -     STG 3 Progress New  -     STG 4 Patient will improve sitting tolerance from 20 min to >1 hour without LBP to improve participation in job activities.  -     STG 4 Progress New  -     STG 5 --  -     STG 5 Progress --  -        Long Term Goals    LTG Date to Achieve 06/24/24  -     LTG 1 Pt. will be independent with advanced HEP to improve long term independence with self management of condition.  -     LTG 1 Progress New  -     LTG 2 Pt. will score </= 20% on Modified Oswestry to indicate improved perception of disability.  -     LTG 2 Progress New  -     LTG 3 Pt. will demonstrate proper TrA engagement in standing and dynamic movements to improve lumbopelvic stability.  -     LTG 3 Progress New  -     LTG 4 Patient will improve ability to sleep through the night without sleep disturbances related to back pain to improve overall sleep quality and quality of life.  -     LTG 4 Progress New  -     LTG 5 Patient will improve standing tolerance from 10 min to >1 hour without LBP to improve participation in job activities.  -     LTG 5 Progress New  -        Time Calculation    PT Goal Re-Cert Due Date 07/24/24  -               User Key  (r) = Recorded By, (t) = Taken By, (c) = Cosigned By      Initials Name Provider Type    Shoaib Giraldo, PT Physical Therapist                     PT Assessment/Plan       Row Name 04/25/24 0700          PT Assessment    Functional Limitations Impaired locomotion;Limitations in community activities;Performance in leisure activities;Performance in self-care ADL;Impaired gait;Decreased safety during functional activities;Limitation in home management;Limitations in functional capacity and performance;Performance in work activities  -      "Impairments Impaired flexibility;Posture;Poor body mechanics;Pain;Muscle strength;Locomotion;Range of motion;Gait;Joint mobility;Balance;Endurance;Impaired muscle power;Joint integrity;Motor function;Peripheral nerve integrity;Sensation  -DR     Assessment Comments Amberly Sanders is a 33 y.o. year-old female referred to physical therapy for acute lumbar strain after assisting with a transfer, getting a patient out of bed on 4/18/24. Pt went to ER on 4/19/24 d/t pain into lumbar region preventing her to get out of bed, where they performed a CT scan showing \"At L5-S1, there is a relatively focal osteophyte within the left subarticular region which abuts the descending left S1 nerve root sleeve. At the L4-5 level, there is a disc bulge eccentric to the left and this may abut the left L5 nerve root sleeve.\"  She presents with an evolving clinical presentation. She has comorbidities of HTN, diabetes, PCOS, HLD, morbid obesity and personal factors of increased BMI, job requirements, and imaging that may affect her progress in the plan of care. Self scored disability measure of LIA was a 29/50, where 50/50 is fully disabled. She demonstrated impaired posture with increased lumbar lordosis in both seated and standing position, (+) Slump and SLR test bilaterally, impaired strength and lumbar ROM in all cardinal planes, and diminished sensation in RLE > LLE. Unable to assess flexibility, palpation, reflexes, and thoracolumbar accessory motions due to increased pain and increased fatty tissue surrounding the area. Pt reports episode of urinary incontinence this AM while in bed, and has had increased urge frequency since ER visit. 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 No turning at the waist and no bending to  at the waist while sitting. Encourage pt reach out to PCP today to inform MD of new onset of symptoms. Discussed red flag " symptoms of lumbar injuries and to report to ED immediately if onset of symptoms are new or worsening. Pt verbally agreed. She does have a referral in to neurosurgery, but no appt made at this time. Signs and symptoms are consistent with referring diagnosis. She is appropriate for skilled therapy services at this time to address deficits and improve ease with job requirements and ADLs.  -     Please refer to paper survey for additional self-reported information No  -DR     Rehab Potential Good  -DR     Patient/caregiver participated in establishment of treatment plan and goals Yes  -DR     Patient would benefit from skilled therapy intervention Yes  -DR        PT Plan    PT Frequency 2x/week  -     Predicted Duration of Therapy Intervention (PT) 12 visits  -     Planned CPT's? PT RE-EVAL: 81089;PT THER PROC EA 15 MIN: 15499;PT THER ACT EA 15 MIN: 93979;PT MANUAL THERAPY EA 15 MIN: 75766;PT NEUROMUSC RE-EDUCATION EA 15 MIN: 73611;PT GAIT TRAINING EA 15 MIN: 40694;PT SELF CARE/HOME MGMT/TRAIN EA 15: 71608;PT HOT OR COLD PACK TREAT MCARE;PT ELECTRICAL STIM UNATTEND: ;PT ELECTRICAL STIM ATTD EA 15 MIN: 05044;PT ULTRASOUND EA 15 MIN: 73250;PT TRACTION LUMBAR: 90028;PT EVAL HIGH COMPLEXITY: 10627;PT EVAL AQUA: 96971;PT AQUATIC THERAPY EA 15 MIN: 68968;PT HOT/COLD PACK WC NONMCARE: 58190  -DR     PT Plan Comments assess pt tolerance to initial evaluation/compliance with HEP. likely unable to w/u on nustep- maybe 2 min walk around clinic. likely seated or standing (as tolerated) exercises only. glute sets, seated PPT, hip add, hip abd, shoulder ext with TrA, perterbations in seated position with TrA.  -               User Key  (r) = Recorded By, (t) = Taken By, (c) = Cosigned By      Initials Name Provider Type    Shoaib Giraldo, PT Physical Therapist                       OP Exercises       Row Name 04/25/24 0700             Subjective    Subjective Comments bryan  -         Total Minutes    16524 - PT  Therapeutic Exercise Minutes 8  -DR         Exercise 1    Exercise Name 1 TrA bracing in seated and supine  -DR      Cueing 1 Demo  -DR      Reps 1 20e  -DR      Time 1 5s  -DR      Additional Comments --  -DR         Exercise 2    Exercise Name 2 LTR  -DR      Cueing 2 Demo  -DR      Reps 2 20  -DR      Time 2 5s  -DR      Additional Comments minimal knee flexion in supine, but able to get a stretch  -DR         Exercise 3    Exercise Name 3 bkwd shoulder rolls  -DR      Cueing 3 Verbal;Demo  -DR      Reps 3 20e  -DR         Exercise 4    Exercise Name 4 scap retraction  -DR      Cueing 4 Demo  -DR      Reps 4 15  -DR      Time 4 5s  -DR         Exercise 5    Exercise Name 5 seated sciatic n glides  -DR      Cueing 5 Demo  -DR      Sets 5 1e  -DR      Reps 5 10  -DR                User Key  (r) = Recorded By, (t) = Taken By, (c) = Cosigned By      Initials Name Provider Type    Shoaib Giraldo, PT Physical Therapist                                  Outcome Measure Options: Modified Oswestry  Modified Oswestry  Modified Oswestry Score/Comments: 29/50= 58%      Time Calculation:     Start Time: 0802  Stop Time: 0850  Time Calculation (min): 48 min  Timed Charges  19885 - PT Therapeutic Exercise Minutes: 8  Total Minutes  Timed Charges Total Minutes: 8   Total Minutes: 8     Therapy Charges for Today       Code Description Service Date Service Provider Modifiers Qty    10665572215 HC PT THER PROC EA 15 MIN 4/25/2024 Shoaib Solomon, PT GP 1    66744606935 HC PT EVAL HIGH COMPLEXITY 2 4/25/2024 Shoaib Solomon, PT GP 1            PT G-Codes  Outcome Measure Options: Modified Oswestry  Modified Oswestry Score/Comments: 29/50= 58%         Shoaib Solomon, CHAD  4/25/2024

## 2024-04-29 ENCOUNTER — HOSPITAL ENCOUNTER (OUTPATIENT)
Dept: PHYSICAL THERAPY | Facility: HOSPITAL | Age: 34
Setting detail: THERAPIES SERIES
Discharge: HOME | End: 2024-04-29
Payer: COMMERCIAL
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
## 2024-04-29 DIAGNOSIS — S39.012S LUMBAR STRAIN, SEQUELA: Primary | ICD-10-CM

## 2024-04-29 DIAGNOSIS — M54.42 ACUTE BILATERAL LOW BACK PAIN WITH BILATERAL SCIATICA: ICD-10-CM

## 2024-04-29 DIAGNOSIS — M54.41 ACUTE BILATERAL LOW BACK PAIN WITH BILATERAL SCIATICA: ICD-10-CM

## 2024-04-29 PROCEDURE — 97110 THERAPEUTIC EXERCISES: CPT

## 2024-04-29 NOTE — THERAPY TREATMENT NOTE
Outpatient Physical Therapy Ortho Treatment Note  [facility]     Patient Name: Amberly Sanders  : 1990  MRN: [id number]  Today's Date: 2024      Visit Date: 2024    Visit Dx:    ICD-10-CM ICD-9-CM   1. Lumbar strain, sequela  S39.012S 905.7   2. Acute bilateral low back pain with bilateral sciatica  M54.42 724.2    M54.41 724.3   3. Injury caused by lifting, sequela  X50.0XXS 909.4       There is no problem list on file for this patient.       Past Medical History:   Diagnosis Date    Diabetes mellitus     Hyperlipidemia     Hypertension     Lymphedema     PCOS (polycystic ovarian syndrome)         No past surgical history on file.                     PT Assessment/Plan       Row Name 24 1000          PT Assessment    Assessment Comments Amberly Sanders is a 33 year old female seen for first follow up visit after initial evaluation for acute lumbar strain. She reports ongoing frustration with her situation, as she is having difficulty walking without pain. Walking is a stress reliever. Initiated 2 min clinic walk for warm up, however pt. LBP significantly increased around 1 min and 30 seconds. Increased time this date discussing activation of TrA to offload spine. Attempted seated PPT, however pt. with better understanding and muscle activation when performing standing. Initiated seated GS, and hip ADD as well with good tolerance. Reminders to activate TrA throughout. Pt. remains a good candidate for skilled PT.  -ER        PT Plan    PT Plan Comments Consider high plank at wall with exaggerated march, if tolerating supine, BKTC with small SB (TrA focus), seated HS stretch, sciatic nerve flossing, STS with TrA, seated OH reach w/ small ball TrA focus.  -ER               User Key  (r) = Recorded By, (t) = Taken By, (c) = Cosigned By      Initials Name Provider Type    ER Carlyn Price, PT Physical Therapist                       OP Exercises       Row Name 24 0900              Subjective    Subjective Comments I am frustrated because I feel like I am dojng worse. I am reserved about pain meds because I feel like I didnt need them before.  -ER         Total Minutes    97981 - PT Therapeutic Exercise Minutes 45  -ER         Exercise 1    Exercise Name 1 2 min clinic walk W/u  -ER      Cueing 1 Verbal  -ER      Time 1 2 minutes  -ER         Exercise 3    Exercise Name 3 bkwd shoulder rolls  -ER      Cueing 3 Verbal;Demo  -ER      Reps 3 20e  -ER         Exercise 4    Exercise Name 4 scap retraction  -ER      Cueing 4 Demo  -ER      Reps 4 15  -ER      Time 4 5s  -ER         Exercise 5    Exercise Name 5 seated sciatic n glides  -ER      Cueing 5 Demo  -ER      Sets 5 1e  -ER      Reps 5 10  -ER         Exercise 6    Exercise Name 6 Seated Glut Sets  -ER      Cueing 6 Verbal;Demo  -ER      Sets 6 2  -ER      Reps 6 10  -ER      Time 6 5 sec  -ER         Exercise 7    Exercise Name 7 Seated Hip ADD  -ER      Cueing 7 Verbal;Demo  -ER      Sets 7 2  -ER      Reps 7 10  -ER      Time 7 5 sec  -ER         Exercise 8    Exercise Name 8 Seated PPT  -ER      Cueing 8 Verbal;Demo  -ER      Sets 8 1  -ER      Reps 8 10  -ER      Time 8 10sec  -ER         Exercise 9    Exercise Name 9 Side steps with RTB  -ER      Cueing 9 Verbal;Demo  -ER      Reps 9 5 laps along EOB  -ER      Time 9 RTB  -ER                User Key  (r) = Recorded By, (t) = Taken By, (c) = Cosigned By      Initials Name Provider Type    ER Carlyn Price, PT Physical Therapist                                  PT OP Goals       Row Name 04/29/24 1000          PT Short Term Goals    STG Date to Achieve 05/25/24  -ER     STG 1 Pt. will be independent with initial HEP to improve self-management of condition.  -ER     STG 1 Progress New  -ER     STG 2 Patient will demonstrate proper log roll mechanics with little to no cues when getting on/off treatment table to demonstrate improved mechanics and decreased strain on lumbar spine.  -ER      STG 2 Progress New  -ER     STG 3 Pt will improve lumbar ROM with </=2/10 pain in all cardinal planes for improved mobility and participation in ADLs.  -ER     STG 3 Progress New  -ER     STG 4 Patient will improve sitting tolerance from 20 min to >1 hour without LBP to improve participation in job activities.  -ER     STG 4 Progress New  -ER        Long Term Goals    LTG Date to Achieve 06/24/24  -ER     LTG 1 Pt. will be independent with advanced HEP to improve long term independence with self management of condition.  -ER     LTG 1 Progress New  -ER     LTG 2 Pt. will score </= 20% on Modified Oswestry to indicate improved perception of disability.  -ER     LTG 2 Progress New  -ER     LTG 3 Pt. will demonstrate proper TrA engagement in standing and dynamic movements to improve lumbopelvic stability.  -ER     LTG 3 Progress New  -ER     LTG 4 Patient will improve ability to sleep through the night without sleep disturbances related to back pain to improve overall sleep quality and quality of life.  -ER     LTG 4 Progress New  -ER     LTG 5 Patient will improve standing tolerance from 10 min to >1 hour without LBP to improve participation in job activities.  -ER     LTG 5 Progress New  -ER               User Key  (r) = Recorded By, (t) = Taken By, (c) = Cosigned By      Initials Name Provider Type    Carlyn Noriega, PT Physical Therapist                    Therapy Education  Education Details: Discussed activation of TrA, positioning, posture, pain management. Reinforced and progressed HEP  Given: HEP, Symptoms/condition management, Pain management, Posture/body mechanics, Mobility training  Program: Reinforced, Progressed  How Provided: Verbal, Demonstration, Written  Provided to: Patient  Level of Understanding: Teach back education performed, Verbalized, Demonstrated              Time Calculation:   Start Time: 0900  Stop Time: 0945  Time Calculation (min): 45 min  Total Timed Code Minutes- PT: 45  minute(s)  Timed Charges  89352 - PT Therapeutic Exercise Minutes: 45  Total Minutes  Timed Charges Total Minutes: 45   Total Minutes: 45  Therapy Charges for Today       Code Description Service Date Service Provider Modifiers Qty    [id number]  PT THER PROC EA 15 MIN 4/29/2024 Carlyn Price, PT GP 3                      Carlyn Price, PT  4/29/2024

## 2024-05-01 ENCOUNTER — HOSPITAL ENCOUNTER (OUTPATIENT)
Dept: PHYSICAL THERAPY | Facility: HOSPITAL | Age: 34
Setting detail: THERAPIES SERIES
Discharge: HOME | End: 2024-05-01
Payer: COMMERCIAL

## 2024-05-01 DIAGNOSIS — S39.012S LUMBAR STRAIN, SEQUELA: Primary | ICD-10-CM

## 2024-05-01 DIAGNOSIS — M54.42 ACUTE BILATERAL LOW BACK PAIN WITH BILATERAL SCIATICA: ICD-10-CM
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
## 2024-05-01 DIAGNOSIS — M54.41 ACUTE BILATERAL LOW BACK PAIN WITH BILATERAL SCIATICA: ICD-10-CM

## 2024-05-01 PROCEDURE — 97110 THERAPEUTIC EXERCISES: CPT

## 2024-05-01 NOTE — THERAPY TREATMENT NOTE
Outpatient Physical Therapy Ortho Treatment Note  [facility]     Patient Name: Amberly Sanders  : 1990  MRN: [id number]  Today's Date: 2024      Visit Date: 2024    Visit Dx:    ICD-10-CM ICD-9-CM   1. Lumbar strain, sequela  S39.012S 905.7   2. Acute bilateral low back pain with bilateral sciatica  M54.42 724.2    M54.41 724.3   3. Injury caused by lifting, sequela  X50.0XXS 909.4       There is no problem list on file for this patient.       Past Medical History:   Diagnosis Date    Diabetes mellitus     Hyperlipidemia     Hypertension     Lymphedema     PCOS (polycystic ovarian syndrome)         No past surgical history on file.                     PT Assessment/Plan       Row Name 24 1100          PT Assessment    Assessment Comments Amberly Sanders returns for physical therapy treatment of acute lumbar strain. Pt. Worked yesterday, and notes that her pain initially starts as a burning in her abdomen, and as the day progresses it works its way around her waistband, into her gluts, down her legs and eventually travels up to her mid back/thoracic spine. At pt. Time of evaluation, she had 1 episode of incontinence, and since evaluation on 24 she has had two more instances of incontinence which is not her baseline. She notes that it is urge incontinence, however may have functional incontinence as it is difficult to log roll in time to make it to the bathroom from bed. She denies N/T in the pelvis, however was educated on signs and symptoms of saddle anesthesia and if they present to report to the ER. Encouraged patient to reach out to PCP for evaluation secondary to onset of incontinence and due to the frequency since initial injury. Spent increased time discussing importance of body mechanics and posture, with use of mirror for visual aid. Use of mirror greatly successful for cueing and recruitment of appropriate musculature. Added low rows/shoulder extensions with gentle  resistance, and pt. Is able to activate posterior scapular girdle. 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 Also added AR press with resistance with focus on core activation with great response. Earlier on in session, attempted LTR in the supine position, however pt. Unable to tolerate for more than 2 reps due to increased sensation in BLE (burning feeling). Discontinued this activity until pt. Is able to see PCP. Initiated treatment session this date with clinic walking for warm up, previously walking 1:31 seconds, this date able to walk 3:07 seconds before onset of pain.  Eventually down the line, pt. May benefit from aquatic therapy, and will continue to benefit from skilled PT in the OP setting. Updated HEP and provided resistance bands. PT will continue to follow.  -ER        PT Plan    PT Plan Comments Assess response to last visit, consider trialing LTR with wedge, STS with TrA, seated OH reach with small ball, exaggerated march walk, HS stretch, standing HS curl, monster walks/lateral walks?  wall washes? High plank at wall with mountain climber? standing hip ext?  -ER               User Key  (r) = Recorded By, (t) = Taken By, (c) = Cosigned By      Initials Name Provider Type    ER Carlyn Price, PT Physical Therapist                       OP Exercises       Row Name 05/01/24 0900             Subjective    Subjective Comments I went to work yesterday, it is still very difficult for me. My abdomen starts burning, and then the pain wraps all the way around, down my legs and up to my mid thoracic spine. I also had another episode of incontinence again. I have urge and I cant get up in time. This is the 3rd episode of incontinence which is not my baseline.  -ER         Total Minutes    97266 - PT Therapeutic Exercise Minutes 45  -ER         Exercise 1    Exercise Name 1 Clinic walk warm up  -ER      Cueing 1 Verbal  -ER      Time 1 3:07 this date  -ER          Exercise 2    Exercise Name 2 LTR  -ER      Additional Comments Unable to perform due to significant N/T in BLE  -ER         Exercise 3    Exercise Name 3 bkwd shoulder rolls  -ER      Cueing 3 Verbal;Demo  -ER      Reps 3 20e  -ER         Exercise 4    Exercise Name 4 scap retraction  -ER      Cueing 4 Demo  -ER      Reps 4 15  -ER      Time 4 5s  -ER         Exercise 5    Exercise Name 5 seated sciatic n glides  -ER      Cueing 5 Demo  -ER      Sets 5 1e  -ER      Reps 5 10  -ER         Exercise 7    Exercise Name 7 Seated Hip ADD  -ER      Cueing 7 Verbal;Demo  -ER      Sets 7 2  -ER      Reps 7 10  -ER      Time 7 5 sec  -ER         Exercise 8    Exercise Name 8 Standing PPT  -ER      Cueing 8 Verbal;Demo  -ER      Sets 8 1  -ER      Reps 8 10  -ER      Time 8 10sec  -ER         Exercise 10    Exercise Name 10 AR press  -ER      Cueing 10 Verbal;Demo  -ER      Sets 10 1  -ER      Reps 10 10  -ER      Time 10 GTB  -ER         Exercise 11    Exercise Name 11 Rows/shoulder extensions  -ER      Cueing 11 Verbal;Demo  -ER      Sets 11 2  -ER      Reps 11 10e  -ER      Time 11 YTB  -ER                User Key  (r) = Recorded By, (t) = Taken By, (c) = Cosigned By      Initials Name Provider Type    ER Carlyn Price, PT Physical Therapist                                  PT OP Goals       Row Name 05/01/24 1100          PT Short Term Goals    STG Date to Achieve 05/25/24  -ER     STG 1 Pt. will be independent with initial HEP to improve self-management of condition.  -ER     STG 1 Progress New  -ER     STG 2 Patient will demonstrate proper log roll mechanics with little to no cues when getting on/off treatment table to demonstrate improved mechanics and decreased strain on lumbar spine.  -ER     STG 2 Progress New  -ER     STG 3 Pt will improve lumbar ROM with </=2/10 pain in all cardinal planes for improved mobility and participation in ADLs.  -ER     STG 3 Progress New  -ER     STG 4 Patient will improve sitting  tolerance from 20 min to >1 hour without LBP to improve participation in job activities.  -ER     STG 4 Progress New  -ER        Long Term Goals    LTG Date to Achieve 06/24/24  -ER     LTG 1 Pt. will be independent with advanced HEP to improve long term independence with self management of condition.  -ER     LTG 1 Progress New  -ER     LTG 2 Pt. will score </= 20% on Modified Oswestry to indicate improved perception of disability.  -ER     LTG 2 Progress New  -ER     LTG 3 Pt. will demonstrate proper TrA engagement in standing and dynamic movements to improve lumbopelvic stability.  -ER     LTG 3 Progress New  -ER     LTG 4 Patient will improve ability to sleep through the night without sleep disturbances related to back pain to improve overall sleep quality and quality of life.  -ER     LTG 4 Progress New  -ER     LTG 5 Patient will improve standing tolerance from 10 min to >1 hour without LBP to improve participation in job activities.  -ER     LTG 5 Progress New  -ER               User Key  (r) = Recorded By, (t) = Taken By, (c) = Cosigned By      Initials Name Provider Type    ER Carlyn Price, PT Physical Therapist                    Therapy Education  Education Details: Discussed HEP and performance in different positions such as standing, seated, lying - use of mirror for body mechanics and posture for muscle recruitment. Discussed s/s of saddle anesthesia.  Given: HEP, Symptoms/condition management, Pain management, Posture/body mechanics  Program: Reinforced  How Provided: Verbal, Demonstration, Written  Provided to: Patient  Level of Understanding: Teach back education performed, Verbalized, Demonstrated              Time Calculation:   Start Time: 0900  Stop Time: 0945  Time Calculation (min): 45 min  Total Timed Code Minutes- PT: 45 minute(s)  Timed Charges  59750 - PT Therapeutic Exercise Minutes: 45  Total Minutes  Timed Charges Total Minutes: 45   Total Minutes: 45  Therapy Charges for Today        Code Description Service Date Service Provider Modifiers Qty    85683113587 HC PT THER PROC EA 15 MIN 5/1/2024 Carlyn Price, PT GP 3                      Carlyn Price, PT  5/1/2024

## 2024-05-05 ENCOUNTER — HOSPITAL ENCOUNTER (EMERGENCY)
Facility: HOSPITAL | Age: 34
Discharge: HOME | End: 2024-05-05
Attending: EMERGENCY MEDICINE | Admitting: EMERGENCY MEDICINE
Payer: COMMERCIAL

## 2024-05-05 ENCOUNTER — APPOINTMENT (OUTPATIENT)
Dept: CT IMAGING | Facility: HOSPITAL | Age: 34
End: 2024-05-05
Payer: COMMERCIAL

## 2024-05-05 VITALS
TEMPERATURE: 98.5 F | WEIGHT: 293 LBS | HEIGHT: 69 IN | OXYGEN SATURATION: 97 % | RESPIRATION RATE: 16 BRPM | DIASTOLIC BLOOD PRESSURE: 72 MMHG | BODY MASS INDEX: 43.4 KG/M2 | HEART RATE: 71 BPM | SYSTOLIC BLOOD PRESSURE: 127 MMHG

## 2024-05-05 DIAGNOSIS — R11.0 NAUSEA: ICD-10-CM

## 2024-05-05 DIAGNOSIS — R10.10 ACUTE UPPER ABDOMINAL PAIN: Primary | ICD-10-CM

## 2024-05-05 LAB
ALBUMIN SERPL-MCNC: 4 G/DL (ref 3.5–5.2)
ALBUMIN/GLOB SERPL: 1.4 G/DL
ALP SERPL-CCNC: 86 U/L (ref 39–117)
ALT SERPL W P-5'-P-CCNC: 19 U/L (ref 1–33)
ANION GAP SERPL CALCULATED.3IONS-SCNC: 8 MMOL/L (ref 5–15)
AST SERPL-CCNC: 17 U/L (ref 1–32)
BASOPHILS # BLD MANUAL: 0.13 10*3/MM3 (ref 0–0.2)
BASOPHILS NFR BLD MANUAL: 1 % (ref 0–1.5)
BILIRUB SERPL-MCNC: 0.3 MG/DL (ref 0–1.2)
BILIRUB UR QL STRIP: NEGATIVE
BUN SERPL-MCNC: 11 MG/DL (ref 6–20)
BUN/CREAT SERPL: 12.4 (ref 7–25)
CALCIUM SPEC-SCNC: 8.3 MG/DL (ref 8.6–10.5)
CHLORIDE SERPL-SCNC: 107 MMOL/L (ref 98–107)
CLARITY UR: CLEAR
CO2 SERPL-SCNC: 27 MMOL/L (ref 22–29)
COLOR UR: YELLOW
CREAT SERPL-MCNC: 0.89 MG/DL (ref 0.57–1)
DEPRECATED RDW RBC AUTO: 44.4 FL (ref 37–54)
EGFRCR SERPLBLD CKD-EPI 2021: 87.4 ML/MIN/1.73
EOSINOPHIL # BLD MANUAL: 0.53 10*3/MM3 (ref 0–0.4)
EOSINOPHIL NFR BLD MANUAL: 4 % (ref 0.3–6.2)
ERYTHROCYTE [DISTWIDTH] IN BLOOD BY AUTOMATED COUNT: 14.5 % (ref 12.3–15.4)
GLOBULIN UR ELPH-MCNC: 2.9 GM/DL
GLUCOSE SERPL-MCNC: 171 MG/DL (ref 65–99)
GLUCOSE UR STRIP-MCNC: NEGATIVE MG/DL
HCG SERPL QL: NEGATIVE
HCT VFR BLD AUTO: 36.2 % (ref 34–46.6)
HGB BLD-MCNC: 11.5 G/DL (ref 12–15.9)
HGB UR QL STRIP.AUTO: NEGATIVE
HOLD SPECIMEN: NORMAL
HOLD SPECIMEN: NORMAL
HYPOCHROMIA BLD QL: ABNORMAL
KETONES UR QL STRIP: NEGATIVE
LEUKOCYTE ESTERASE UR QL STRIP.AUTO: NEGATIVE
LIPASE SERPL-CCNC: 18 U/L (ref 13–60)
LYMPHOCYTES # BLD MANUAL: 5.57 10*3/MM3 (ref 0.7–3.1)
LYMPHOCYTES NFR BLD MANUAL: 5.1 % (ref 5–12)
MCH RBC QN AUTO: 26.4 PG (ref 26.6–33)
MCHC RBC AUTO-ENTMCNC: 31.8 G/DL (ref 31.5–35.7)
MCV RBC AUTO: 83 FL (ref 79–97)
MONOCYTES # BLD: 0.67 10*3/MM3 (ref 0.1–0.9)
NEUTROPHILS # BLD AUTO: 6.24 10*3/MM3 (ref 1.7–7)
NEUTROPHILS NFR BLD MANUAL: 47.5 % (ref 42.7–76)
NITRITE UR QL STRIP: NEGATIVE
PH UR STRIP.AUTO: 6 [PH] (ref 5–8)
PLAT MORPH BLD: NORMAL
PLATELET # BLD AUTO: 319 10*3/MM3 (ref 140–450)
PMV BLD AUTO: 9.9 FL (ref 6–12)
POTASSIUM SERPL-SCNC: 3.8 MMOL/L (ref 3.5–5.2)
PROT SERPL-MCNC: 6.9 G/DL (ref 6–8.5)
PROT UR QL STRIP: NEGATIVE
RBC # BLD AUTO: 4.36 10*6/MM3 (ref 3.77–5.28)
SODIUM SERPL-SCNC: 142 MMOL/L (ref 136–145)
SP GR UR STRIP: 1.02 (ref 1–1.03)
UROBILINOGEN UR QL STRIP: NORMAL
VARIANT LYMPHS NFR BLD MANUAL: 42.4 % (ref 19.6–45.3)
WBC MORPH BLD: NORMAL
WBC NRBC COR # BLD AUTO: 13.13 10*3/MM3 (ref 3.4–10.8)
WHOLE BLOOD HOLD COAG: NORMAL
WHOLE BLOOD HOLD SPECIMEN: NORMAL

## 2024-05-05 PROCEDURE — 36415 COLL VENOUS BLD VENIPUNCTURE: CPT

## 2024-05-05 PROCEDURE — 63710000001 ONDANSETRON ODT 4 MG TABLET DISPERSIBLE: Performed by: EMERGENCY MEDICINE

## 2024-05-05 PROCEDURE — 74176 CT ABD & PELVIS W/O CONTRAST: CPT

## 2024-05-05 PROCEDURE — 83690 ASSAY OF LIPASE: CPT

## 2024-05-05 PROCEDURE — 81003 URINALYSIS AUTO W/O SCOPE: CPT

## 2024-05-05 PROCEDURE — 85025 COMPLETE CBC W/AUTO DIFF WBC: CPT

## 2024-05-05 PROCEDURE — 85007 BL SMEAR W/DIFF WBC COUNT: CPT

## 2024-05-05 PROCEDURE — 80053 COMPREHEN METABOLIC PANEL: CPT

## 2024-05-05 PROCEDURE — 84703 CHORIONIC GONADOTROPIN ASSAY: CPT

## 2024-05-05 PROCEDURE — 99284 EMERGENCY DEPT VISIT MOD MDM: CPT

## 2024-05-05 RX ORDER — ONDANSETRON 4 MG/1
4 TABLET, ORALLY DISINTEGRATING ORAL EVERY 6 HOURS PRN
Qty: 15 TABLET | Refills: 0 | Status: SHIPPED | OUTPATIENT
Start: 2024-05-05

## 2024-05-05 RX ORDER — HYDROCODONE BITARTRATE AND ACETAMINOPHEN 7.5; 325 MG/1; MG/1
1 TABLET ORAL ONCE
Status: COMPLETED | OUTPATIENT
Start: 2024-05-05 | End: 2024-05-05

## 2024-05-05 RX ORDER — ONDANSETRON 4 MG/1
4 TABLET, ORALLY DISINTEGRATING ORAL ONCE
Status: COMPLETED | OUTPATIENT
Start: 2024-05-05 | End: 2024-05-05

## 2024-05-05 RX ORDER — SODIUM CHLORIDE 0.9 % (FLUSH) 0.9 %
10 SYRINGE (ML) INJECTION AS NEEDED
Status: DISCONTINUED | OUTPATIENT
Start: 2024-05-05 | End: 2024-05-05 | Stop reason: HOSPADM

## 2024-05-05 RX ORDER — DICYCLOMINE HCL 20 MG
20 TABLET ORAL EVERY 6 HOURS PRN
Qty: 20 TABLET | Refills: 0 | Status: SHIPPED | OUTPATIENT
Start: 2024-05-05

## 2024-05-05 RX ADMIN — HYDROCODONE BITARTRATE AND ACETAMINOPHEN 1 TABLET: 7.5; 325 TABLET ORAL at 15:41

## 2024-05-05 RX ADMIN — ONDANSETRON 4 MG: 4 TABLET, ORALLY DISINTEGRATING ORAL at 14:33

## 2024-05-06 ENCOUNTER — HOSPITAL ENCOUNTER (OUTPATIENT)
Dept: PHYSICAL THERAPY | Facility: HOSPITAL | Age: 34
Setting detail: THERAPIES SERIES
Discharge: HOME | End: 2024-05-06
Payer: COMMERCIAL

## 2024-05-06 DIAGNOSIS — M54.41 ACUTE BILATERAL LOW BACK PAIN WITH BILATERAL SCIATICA: ICD-10-CM

## 2024-05-06 DIAGNOSIS — S39.012S LUMBAR STRAIN, SEQUELA: Primary | ICD-10-CM
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
## 2024-05-06 DIAGNOSIS — M54.42 ACUTE BILATERAL LOW BACK PAIN WITH BILATERAL SCIATICA: ICD-10-CM

## 2024-05-06 PROCEDURE — 97110 THERAPEUTIC EXERCISES: CPT

## 2024-05-08 ENCOUNTER — OFFICE VISIT (OUTPATIENT)
Dept: NEUROSURGERY | Facility: CLINIC | Age: 34
End: 2024-05-08
Payer: COMMERCIAL

## 2024-05-08 VITALS
WEIGHT: 293 LBS | RESPIRATION RATE: 16 BRPM | DIASTOLIC BLOOD PRESSURE: 82 MMHG | SYSTOLIC BLOOD PRESSURE: 134 MMHG | HEART RATE: 77 BPM | HEIGHT: 69 IN | OXYGEN SATURATION: 97 % | BODY MASS INDEX: 43.4 KG/M2

## 2024-05-08 DIAGNOSIS — M54.40 BACK PAIN OF LUMBAR REGION WITH SCIATICA: Primary | ICD-10-CM

## 2024-05-08 PROCEDURE — 99203 OFFICE O/P NEW LOW 30 MIN: CPT | Performed by: NEUROLOGICAL SURGERY

## 2024-05-08 RX ORDER — INSULIN ASPART 100 [IU]/ML
INJECTION, SOLUTION INTRAVENOUS; SUBCUTANEOUS
COMMUNITY
Start: 2023-11-22

## 2024-05-09 ENCOUNTER — TELEPHONE (OUTPATIENT)
Dept: NEUROSURGERY | Facility: CLINIC | Age: 34
End: 2024-05-09

## 2024-05-09 ENCOUNTER — TELEPHONE (OUTPATIENT)
Dept: NEUROSURGERY | Facility: CLINIC | Age: 34
End: 2024-05-09
Payer: MEDICAID

## 2024-05-09 ENCOUNTER — OFFICE VISIT (OUTPATIENT)
Dept: NEUROSURGERY | Facility: CLINIC | Age: 34
End: 2024-05-09
Payer: COMMERCIAL

## 2024-05-09 ENCOUNTER — HOSPITAL ENCOUNTER (OUTPATIENT)
Dept: PHYSICAL THERAPY | Facility: HOSPITAL | Age: 34
Setting detail: THERAPIES SERIES
Discharge: HOME | End: 2024-05-09
Payer: COMMERCIAL

## 2024-05-09 VITALS
HEART RATE: 86 BPM | DIASTOLIC BLOOD PRESSURE: 82 MMHG | WEIGHT: 293 LBS | HEIGHT: 69 IN | SYSTOLIC BLOOD PRESSURE: 132 MMHG | OXYGEN SATURATION: 97 % | RESPIRATION RATE: 16 BRPM | BODY MASS INDEX: 43.4 KG/M2
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
## 2024-05-09 DIAGNOSIS — M54.40 BACK PAIN OF LUMBAR REGION WITH SCIATICA: Primary | ICD-10-CM

## 2024-05-09 DIAGNOSIS — M54.42 ACUTE BILATERAL LOW BACK PAIN WITH BILATERAL SCIATICA: ICD-10-CM

## 2024-05-09 DIAGNOSIS — S39.012S LUMBAR STRAIN, SEQUELA: Primary | ICD-10-CM

## 2024-05-09 DIAGNOSIS — M54.41 ACUTE BILATERAL LOW BACK PAIN WITH BILATERAL SCIATICA: ICD-10-CM

## 2024-05-09 PROCEDURE — 97110 THERAPEUTIC EXERCISES: CPT

## 2024-05-10 ENCOUNTER — TELEPHONE (OUTPATIENT)
Dept: NEUROSURGERY | Facility: CLINIC | Age: 34
End: 2024-05-10
Payer: MEDICAID

## 2024-05-13 ENCOUNTER — TELEPHONE (OUTPATIENT)
Dept: NEUROSURGERY | Facility: CLINIC | Age: 34
End: 2024-05-13
Payer: MEDICAID

## 2024-05-13 NOTE — TELEPHONE ENCOUNTER
Caller: CRISTINE    Relationship to patient: DR MONTEJO'S OFFICE    Best call back number: [phone]    Patient is needing:     NEED W/C AUTH BEFORE SCHEDULING, AS WELL AS BILLING INFO    [phone]

## 2024-05-13 NOTE — TELEPHONE ENCOUNTER
"Called wilton Barry. Informed her that Dr.Chou davenport called requesting the authorization. She stated that she received my initial request for Dr.Chou davenport authorization on 05/09/2024. She stated that she sent it to the claim reviewer at this current moment this request is pending with the facility. Wilton asked the reasoning for patient going to , informed her that the office visit from 05/09/2024 was sent in the request. 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 For any further activity restrictions she will need to be evaluated by a physiatrist and not a neurosurgeon especially since we do not have a diagnosis or understanding of her current pathology. \" She stated that now she understood because patient has a form that needs to be filled out for her job. Gave adjust  phone number and fax, for she stated she will reach out to their office when she has received the authorization from the reviewer.       Called Dr.Chou davenport. Spoke to Yashira. Informed her that I sent the request for authorization to  [facility] on 05/09/2024. As of today the claim is pending review. Informed Dr.Chou davenport that [facility] has taken their information and will contact them with an update on the authorization once it has been received. Gave Dr.Chou ethel Barry contact information and the claim number.    "

## 2024-05-14 ENCOUNTER — HOSPITAL ENCOUNTER (OUTPATIENT)
Dept: PHYSICAL THERAPY | Facility: HOSPITAL | Age: 34
Setting detail: THERAPIES SERIES
Discharge: HOME | End: 2024-05-14
Payer: COMMERCIAL

## 2024-05-14 DIAGNOSIS — S39.012S LUMBAR STRAIN, SEQUELA: Primary | ICD-10-CM

## 2024-05-14 DIAGNOSIS — M54.42 ACUTE BILATERAL LOW BACK PAIN WITH BILATERAL SCIATICA: ICD-10-CM
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
## 2024-05-14 DIAGNOSIS — M54.41 ACUTE BILATERAL LOW BACK PAIN WITH BILATERAL SCIATICA: ICD-10-CM

## 2024-05-14 PROCEDURE — 97110 THERAPEUTIC EXERCISES: CPT

## 2024-05-14 NOTE — THERAPY TREATMENT NOTE
Outpatient Physical Therapy Ortho Treatment Note  [facility]     Patient Name: Amberly Sanders  : 1990  MRN: [id number]  Today's Date: 2024      Visit Date: 2024    Visit Dx:    ICD-10-CM ICD-9-CM   1. Lumbar strain, sequela  S39.012S 905.7   2. Acute bilateral low back pain with bilateral sciatica  M54.42 724.2    M54.41 724.3   3. Injury caused by lifting, sequela  X50.0XXS 909.4       Patient Active Problem List   Diagnosis    Back pain of lumbar region with sciatica        Past Medical History:   Diagnosis Date    Diabetes mellitus     Hyperlipidemia     Hypertension     Lymphedema     PCOS (polycystic ovarian syndrome)         No past surgical history on file.     PT Ortho       Row Name 24 0800       Subjective Pain    Able to rate subjective pain? yes  -ER    Pre-Treatment Pain Level 5  -ER       Myotomal Screen- Lower Quarter Clearing    Hip flexion (L2) Bilateral:;4- (Good -)  -ER    Knee extension (L3) Bilateral:;4- (Good -)  Painful on L  -ER    Ankle DF (L4) Right:;3+ (Fair +);Left:;4- (Good -)  -ER    Knee flexion (S2) Bilateral:;4- (Good -)  -ER              User Key  (r) = Recorded By, (t) = Taken By, (c) = Cosigned By      Initials Name Provider Type    ER Carlyn Price, PT Physical Therapist                                 PT Assessment/Plan       Row Name 24 0800          PT Assessment    Assessment Comments Amberly Sanders is a 33 year old female seen for physical therapy treatment of acute lumbar strain. Pt. having a worse off day this date, with reports of pain in bilateral hips, burning in abdomen (which eventually worked its way up to sternal region), and complaints of headache. She also ambulates into the clinic with antalgic gait pattern, reporting that she feels as though her gait is unsteady. Formal assessment of LE strength, demonstrates reduced strength of bilateral DF, worse on R side. step length uneven, with reduced stance time on RLE. Pt.  reports that L side feels worse however. Reduced tolerance to therex this date due to pain, alternating between standing and sitting therex for improved tolerance. Assessed  bed mobility, pt. does modified log roll typically, however had pt. go from supine>long sit to assess core strength which reproduced abdominal pain. Increased time spent educating pt. on reducing freq. of valsalva maneuver when lifting/transferring (even with low weight) to ensure reduced pressure throgh abdomen. Discussed pain management techniques such as frequent walking, use of ICE and biofreeze as tolerated. Pt. remains a good candidate for skilled PT.  -ER        PT Plan    PT Plan Comments Assess response to last visit (previously reporting bilat hip pain, LBP, weak DF when gait training, and sternal pain.) w/ HA. Continue to work within tolerance. Potentially do shelf tap (low weight to assess lifting form),  STS with TrA, seated OH reach with small ball and TrA, seated or standing HS stretch, pt. would likely benefit from piriformis stretch seated or supine w/ wedge? High plank at wall with hip extension  -ER               User Key  (r) = Recorded By, (t) = Taken By, (c) = Cosigned By      Initials Name Provider Type    ER Carlyn Price, PT Physical Therapist                       OP Exercises       Row Name 05/14/24 0800             Subjective    Subjective Comments I feel like my gait is unsteady. I have to be mindful of my step length.  -ER         Subjective Pain    Able to rate subjective pain? yes  -ER      Pre-Treatment Pain Level 5  -ER         Total Minutes    08046 - PT Therapeutic Exercise Minutes 38  -ER         Exercise 1    Exercise Name 1 Clinic walk warm up  -ER      Cueing 1 Verbal  -ER      Time 1 2:45 this date, shuffled gait pattern  -ER         Exercise 3    Exercise Name 3 bkwd shoulder rolls  -ER      Cueing 3 Verbal;Demo  -ER      Reps 3 20e  -ER         Exercise 4    Exercise Name 4 scap retraction  -ER       Cueing 4 Demo  -ER      Reps 4 15  -ER      Time 4 5s  -ER         Exercise 7    Exercise Name 7 Seated Hip ADD  -ER      Cueing 7 Verbal;Demo  -ER      Sets 7 2  -ER      Reps 7 10  -ER      Time 7 5 sec  -ER         Exercise 8    Exercise Name 8 Standing PPT  -ER      Cueing 8 Verbal;Demo  -ER      Sets 8 1  -ER      Reps 8 10  -ER      Time 8 10sec  -ER         Exercise 9    Exercise Name 9 Side steps BW/ exaggerated march walk  -ER      Cueing 9 Verbal;Demo  -ER      Reps 9 1 lap  -ER      Time 9 BW  -ER                User Key  (r) = Recorded By, (t) = Taken By, (c) = Cosigned By      Initials Name Provider Type    ER Carlyn Price, PT Physical Therapist                                  PT OP Goals       Row Name 05/14/24 0800          PT Short Term Goals    STG Date to Achieve 05/25/24  -ER     STG 1 Pt. will be independent with initial HEP to improve self-management of condition.  -ER     STG 1 Progress Ongoing  -ER     STG 2 Patient will demonstrate proper log roll mechanics with little to no cues when getting on/off treatment table to demonstrate improved mechanics and decreased strain on lumbar spine.  -ER     STG 2 Progress Ongoing  -ER     STG 3 Pt will improve lumbar ROM with </=2/10 pain in all cardinal planes for improved mobility and participation in ADLs.  -ER     STG 3 Progress Ongoing  -ER     STG 4 Patient will improve sitting tolerance from 20 min to >1 hour without LBP to improve participation in job activities.  -ER     STG 4 Progress Ongoing  -ER        Long Term Goals    LTG Date to Achieve 06/24/24  -ER     LTG 1 Pt. will be independent with advanced HEP to improve long term independence with self management of condition.  -ER     LTG 1 Progress Ongoing  -ER     LTG 2 Pt. will score </= 20% on Modified Oswestry to indicate improved perception of disability.  -ER     LTG 2 Progress Ongoing  -ER     LTG 3 Pt. will demonstrate proper TrA engagement in standing and dynamic movements to improve  lumbopelvic stability.  -ER     LTG 3 Progress Ongoing  -ER     LTG 4 Patient will improve ability to sleep through the night without sleep disturbances related to back pain to improve overall sleep quality and quality of life.  -ER     LTG 4 Progress Ongoing  -ER     LTG 5 Patient will improve standing tolerance from 10 min to >1 hour without LBP to improve participation in job activities.  -ER     LTG 5 Progress Ongoing  -ER               User Key  (r) = Recorded By, (t) = Taken By, (c) = Cosigned By      Initials Name Provider Type    ER Carlyn Price, PT Physical Therapist                    Therapy Education  Education Details: Reinforced body mechanics, discussed elimination of valsalva maneuver with lifting (even small weight), gait mechanics, safety with ambulation, pain management techniques and HEP modifications  Given: HEP, Symptoms/condition management, Pain management, Posture/body mechanics, Fall prevention and home safety, Mobility training  Program: Reinforced, Progressed  How Provided: Verbal, Demonstration  Provided to: Patient  Level of Understanding: Teach back education performed, Verbalized, Demonstrated              Time Calculation:   Start Time: 0815  Stop Time: 0853  Time Calculation (min): 38 min  Total Timed Code Minutes- PT: 38 minute(s)  Timed Charges  82518 - PT Therapeutic Exercise Minutes: 38  Total Minutes  Timed Charges Total Minutes: 38   Total Minutes: 38  Therapy Charges for Today       Code Description Service Date Service Provider Modifiers Qty    69262638721  PT THER PROC EA 15 MIN 5/14/2024 Carlyn Price, PT GP 3                      Carlyn Price PT  5/14/2024

## 2024-05-16 ENCOUNTER — HOSPITAL ENCOUNTER (OUTPATIENT)
Dept: PHYSICAL THERAPY | Facility: HOSPITAL | Age: 34
Setting detail: THERAPIES SERIES
Discharge: HOME | End: 2024-05-16
Payer: COMMERCIAL

## 2024-05-16 DIAGNOSIS — M54.42 ACUTE BILATERAL LOW BACK PAIN WITH BILATERAL SCIATICA: ICD-10-CM
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
## 2024-05-16 DIAGNOSIS — M54.41 ACUTE BILATERAL LOW BACK PAIN WITH BILATERAL SCIATICA: ICD-10-CM

## 2024-05-16 DIAGNOSIS — S39.012S LUMBAR STRAIN, SEQUELA: Primary | ICD-10-CM

## 2024-05-16 PROCEDURE — 97110 THERAPEUTIC EXERCISES: CPT

## 2024-05-16 NOTE — THERAPY TREATMENT NOTE
Outpatient Physical Therapy Ortho Treatment Note  [facility]     Patient Name: Amberly Sanders  : 1990  MRN: [id number]  Today's Date: 2024      Visit Date: 2024    Visit Dx:    ICD-10-CM ICD-9-CM   1. Lumbar strain, sequela  S39.012S 905.7   2. Acute bilateral low back pain with bilateral sciatica  M54.42 724.2    M54.41 724.3   3. Injury caused by lifting, sequela  X50.0XXS 909.4       Patient Active Problem List   Diagnosis    Back pain of lumbar region with sciatica        Past Medical History:   Diagnosis Date    Diabetes mellitus     Hyperlipidemia     Hypertension     Lymphedema     PCOS (polycystic ovarian syndrome)         No past surgical history on file.                     PT Assessment/Plan       Row Name 24 1200          PT Assessment    Assessment Comments Amberly Sanders is a 33 year old female seen for physical therapy treatment session for acute LBP/acute lumbar strain. Today, pt. still with reduced tolerance to ambulating, ~2:45 this date before abdominal pain sets in. Pt. to get imaging soon, hopeful that this will clear up some of her concerns. Added standing GS with good tolerance, especially with standing PPT and muscle recruitment. Reintroduced lateral stepping, march walking with TrA. Step length short and guarded however able to tolerate for 4 laps. Finally, added standing TrA with OH reach. Pt. demonstrates improved posture with targeted cues and use of mirror. Pt. remains a good candidate for skilled PT.  -ER        PT Plan    PT Plan Comments Consider banded walk outs (hold like row and do backwards steps), consider seated HS stretcg or standing if not tolerating sitting, seated piriformis stretch if tolerable, shelf tap (lifting technique), hip taps to mat?  -ER               User Key  (r) = Recorded By, (t) = Taken By, (c) = Cosigned By      Initials Name Provider Type    ER Carlyn Price, PT Physical Therapist                       OP Exercises        Row Name 05/16/24 0800             Subjective    Subjective Comments I had a bad day last time.  -ER         Subjective Pain    Able to rate subjective pain? yes  -ER      Pre-Treatment Pain Level 5  -ER         Total Minutes    84735 - PT Therapeutic Exercise Minutes 45  -ER         Exercise 1    Exercise Name 1 Clinic walk warm up  -ER      Cueing 1 Verbal  -ER      Time 1 2:32 this date, abdominal pain  -ER         Exercise 3    Exercise Name 3 bkwd shoulder rolls  -ER      Cueing 3 Verbal;Demo  -ER      Reps 3 20e  -ER         Exercise 4    Exercise Name 4 scap retraction  -ER      Cueing 4 Demo  -ER      Reps 4 15  -ER      Time 4 5s  -ER         Exercise 8    Exercise Name 8 Standing PPT  -ER      Cueing 8 Verbal;Demo  -ER      Sets 8 1  -ER      Reps 8 10  -ER      Time 8 10sec  -ER         Exercise 9    Exercise Name 9 Side steps BW/ exaggerated march walk  -ER      Cueing 9 Verbal;Demo  -ER      Reps 9 1 lap  -ER      Time 9 BW  -ER         Exercise 13    Exercise Name 13 Standing TrA with OH lift  -ER      Cueing 13 Verbal;Demo  -ER      Sets 13 1  -ER      Reps 13 10  -ER      Time 13 small unweighted ball  -ER         Exercise 14    Exercise Name 14 Standing GS  -ER      Cueing 14 Verbal;Demo  -ER      Sets 14 1  -ER      Reps 14 10  -ER      Time 14 10sec  -ER                User Key  (r) = Recorded By, (t) = Taken By, (c) = Cosigned By      Initials Name Provider Type    ER Carlyn Price, PT Physical Therapist                                  PT OP Goals       Row Name 05/16/24 1200          PT Short Term Goals    STG Date to Achieve 05/25/24  -ER     STG 1 Pt. will be independent with initial HEP to improve self-management of condition.  -ER     STG 1 Progress Ongoing  -ER     STG 2 Patient will demonstrate proper log roll mechanics with little to no cues when getting on/off treatment table to demonstrate improved mechanics and decreased strain on lumbar spine.  -ER     STG 2 Progress Ongoing  -ER      STG 3 Pt will improve lumbar ROM with </=2/10 pain in all cardinal planes for improved mobility and participation in ADLs.  -ER     STG 3 Progress Ongoing  -ER     STG 4 Patient will improve sitting tolerance from 20 min to >1 hour without LBP to improve participation in job activities.  -ER     STG 4 Progress Ongoing  -ER        Long Term Goals    LTG Date to Achieve 06/24/24  -ER     LTG 1 Pt. will be independent with advanced HEP to improve long term independence with self management of condition.  -ER     LTG 1 Progress Ongoing  -ER     LTG 2 Pt. will score </= 20% on Modified Oswestry to indicate improved perception of disability.  -ER     LTG 2 Progress Ongoing  -ER     LTG 3 Pt. will demonstrate proper TrA engagement in standing and dynamic movements to improve lumbopelvic stability.  -ER     LTG 3 Progress Ongoing  -ER     LTG 4 Patient will improve ability to sleep through the night without sleep disturbances related to back pain to improve overall sleep quality and quality of life.  -ER     LTG 4 Progress Ongoing  -ER     LTG 5 Patient will improve standing tolerance from 10 min to >1 hour without LBP to improve participation in job activities.  -ER     LTG 5 Progress Ongoing  -ER               User Key  (r) = Recorded By, (t) = Taken By, (c) = Cosigned By      Initials Name Provider Type    ER Carlyn Price PT Physical Therapist                    Therapy Education  Education Details: Reinforced body mechanics, pain tolerance, healing time frames  Given: Symptoms/condition management, Pain management, Posture/body mechanics  Program: Reinforced  How Provided: Verbal, Demonstration  Provided to: Patient  Level of Understanding: Teach back education performed, Verbalized, Demonstrated              Time Calculation:   Start Time: 0815  Stop Time: 0900  Time Calculation (min): 45 min  Total Timed Code Minutes- PT: 45 minute(s)  Timed Charges  31363 - PT Therapeutic Exercise Minutes: 45  Total Minutes  Timed  Charges Total Minutes: 45   Total Minutes: 45  Therapy Charges for Today       Code Description Service Date Service Provider Modifiers Qty    92100419930 HC PT THER PROC EA 15 MIN 5/16/2024 Carlyn Price, PT GP 3                      Carlyn Price, PT  5/16/2024

## 2024-05-17 ENCOUNTER — TELEPHONE (OUTPATIENT)
Dept: NEUROSURGERY | Facility: CLINIC | Age: 34
End: 2024-05-17
Payer: MEDICAID

## 2024-05-17 NOTE — TELEPHONE ENCOUNTER
Called patient and left . [facility]. The document is in the . If she has further questions about the MRI she will have to reach out the  [facility] to request a different facility. Additionally, informed patient that [facility] has denied her referral to . Any questions advised patient to please reach out to [facility].

## 2024-05-21 ENCOUNTER — HOSPITAL ENCOUNTER (OUTPATIENT)
Dept: PHYSICAL THERAPY | Facility: HOSPITAL | Age: 34
Setting detail: THERAPIES SERIES
Discharge: HOME | End: 2024-05-21
Payer: COMMERCIAL
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
## 2024-05-21 DIAGNOSIS — M54.41 ACUTE BILATERAL LOW BACK PAIN WITH BILATERAL SCIATICA: ICD-10-CM

## 2024-05-21 DIAGNOSIS — M54.42 ACUTE BILATERAL LOW BACK PAIN WITH BILATERAL SCIATICA: ICD-10-CM

## 2024-05-21 DIAGNOSIS — S39.012S LUMBAR STRAIN, SEQUELA: Primary | ICD-10-CM

## 2024-05-21 PROCEDURE — 97110 THERAPEUTIC EXERCISES: CPT

## 2024-05-21 NOTE — THERAPY TREATMENT NOTE
Outpatient Physical Therapy Ortho Treatment Note  [facility]     Patient Name: Amberly Sanders  : 1990  MRN: [id number]  Today's Date: 2024      Visit Date: 2024    Visit Dx:    ICD-10-CM ICD-9-CM   1. Lumbar strain, sequela  S39.012S 905.7   2. Acute bilateral low back pain with bilateral sciatica  M54.42 724.2    M54.41 724.3   3. Injury caused by lifting, sequela  X50.0XXS 909.4       Patient Active Problem List   Diagnosis    Back pain of lumbar region with sciatica        Past Medical History:   Diagnosis Date    Diabetes mellitus     Hyperlipidemia     Hypertension     Lymphedema     PCOS (polycystic ovarian syndrome)         No past surgical history on file.                     PT Assessment/Plan       Row Name 24 0800          PT Assessment    Assessment Comments Amberly returns to PT verbalizing frustration that she is having a bad day and has nothing great to say. She verbalizes frustration that her gait is worsening, where she is having to really focus on each step she takes with fear she may trip over her feet if not. Pt does have MRI scheduled for today and f/u with Dr. James on Friday for results of MRI. Pt completed all activities today with pain, unable to find true comfort in any exercise or stretch, and describing pain as shooting down R LE and upwards to thoracic spine. Tried standing PPT with marches with poor tolerance and able to add light TB resistance to side steps with decent tolerance. Discussed future POC with continuing PT vs holding in future to decide other conservative options. Pt remains appropriate for skilled PT at this time.  -DR        PT Plan    PT Plan Comments prog note; how did MRI go? trial Consider banded walk outs (hold like row and do backwards steps), side steps TB, consider seated HS stretcg or standing if not tolerating sitting, seated piriformis stretch if tolerable. update HEP  -DR               User Key  (r) = Recorded By, (t) = Taken  By, (c) = Cosigned By      Initials Name Provider Type    Shoaib Giraldo, PT Physical Therapist                       OP Exercises       Row Name 05/21/24 0800             Subjective    Subjective Comments I honestly am having a bad day and don't want to say much because I have nothing good to say. I feel like my gait is worsening.  -DR         Subjective Pain    Able to rate subjective pain? yes  -DR      Pre-Treatment Pain Level 5  -DR         Total Minutes    57393 - PT Therapeutic Exercise Minutes 38  -DR         Exercise 1    Exercise Name 1 Clinic walk warm up  -DR      Cueing 1 Verbal  -DR      Time 1 2:20 with upper back pain  -DR         Exercise 2    Exercise Name 2 seated HS stretch  -DR      Cueing 2 Demo  -DR      Reps 2 2e  -DR      Time 2 20s  -DR         Exercise 3    Exercise Name 3 bkwd shoulder rolls  -DR      Cueing 3 Verbal;Demo  -DR      Reps 3 20e  -DR         Exercise 5    Exercise Name 5 side steps  -DR      Cueing 5 Demo  -DR      Sets 5 2 laps  -DR      Reps 5 YTB at ankles  -DR      Time 5 small steps- cues for toes fwd  -DR         Exercise 6    Exercise Name 6 PPT with standing marches  -DR      Cueing 6 Demo  -DR      Reps 6 5e  -DR      Time 6 unable to tolerate  -DR         Exercise 8    Exercise Name 8 Standing PPT  -DR      Cueing 8 Verbal;Demo  -DR      Sets 8 1  -DR      Reps 8 20  -DR      Time 8 5s  -DR         Exercise 10    Exercise Name 10 AR press  -DR      Cueing 10 Verbal;Demo  -DR      Sets 10 1  -DR      Reps 10 15  -DR      Time 10 GTB  -DR      Additional Comments TrA  -DR         Exercise 14    Exercise Name 14 Standing GS  -DR      Cueing 14 Verbal;Demo  -DR      Sets 14 1  -DR      Reps 14 10  -DR      Time 14 10s  -DR         Exercise 15    Exercise Name 15 squat taps  -DR      Cueing 15 Demo  -DR      Sets 15 1  -DR      Reps 15 10  -DR      Time 15 elevated mat table  -DR                User Key  (r) = Recorded By, (t) = Taken By, (c) = Cosigned By       Initials Name Provider Type    Shoaib Giraldo, PT Physical Therapist                                  PT OP Goals       Row Name 05/21/24 0800          PT Short Term Goals    STG Date to Achieve 05/25/24  -     STG 1 Pt. will be independent with initial HEP to improve self-management of condition.  -     STG 1 Progress Ongoing  -     STG 2 Patient will demonstrate proper log roll mechanics with little to no cues when getting on/off treatment table to demonstrate improved mechanics and decreased strain on lumbar spine.  -     STG 2 Progress Ongoing  -DR     STG 3 Pt will improve lumbar ROM with </=2/10 pain in all cardinal planes for improved mobility and participation in ADLs.  -     STG 3 Progress Ongoing  -DR     STG 4 Patient will improve sitting tolerance from 20 min to >1 hour without LBP to improve participation in job activities.  -     STG 4 Progress Ongoing  -DR        Long Term Goals    LTG Date to Achieve 06/24/24  -     LTG 1 Pt. will be independent with advanced HEP to improve long term independence with self management of condition.  -     LTG 1 Progress Ongoing  -     LTG 2 Pt. will score </= 20% on Modified Oswestry to indicate improved perception of disability.  -     LTG 2 Progress Ongoing  -DR     LTG 3 Pt. will demonstrate proper TrA engagement in standing and dynamic movements to improve lumbopelvic stability.  -     LTG 3 Progress Ongoing  -     LTG 4 Patient will improve ability to sleep through the night without sleep disturbances related to back pain to improve overall sleep quality and quality of life.  -     LTG 4 Progress Ongoing  -     LTG 5 Patient will improve standing tolerance from 10 min to >1 hour without LBP to improve participation in job activities.  -     LTG 5 Progress Ongoing  -DR               User Key  (r) = Recorded By, (t) = Taken By, (c) = Cosigned By      Initials Name Provider Type    Shoaib Giraldo, PT Physical Therapist                     Therapy Education  Education Details: future POC  Given: Symptoms/condition management, Pain management  Program: Reinforced  How Provided: Verbal, Demonstration  Provided to: Patient  Level of Understanding: Teach back education performed, Verbalized  77727 - PT Self Care/Mgmt Minutes: 4              Time Calculation:   Start Time: 0805  Stop Time: 0847  Time Calculation (min): 42 min  Timed Charges  84441 - PT Therapeutic Exercise Minutes: 38  86781 - PT Self Care/Mgmt Minutes: 4  Total Minutes  Timed Charges Total Minutes: 42   Total Minutes: 42  Therapy Charges for Today       Code Description Service Date Service Provider Modifiers Qty    36030085786 HC PT THER PROC EA 15 MIN 5/21/2024 Shoaib Solomon, PT GP 3                      Shoaib Solomon, PT  5/21/2024

## 2024-05-22 ENCOUNTER — TELEPHONE (OUTPATIENT)
Dept: NEUROSURGERY | Facility: CLINIC | Age: 34
End: 2024-05-22
Payer: MEDICAID

## 2024-05-22 NOTE — TELEPHONE ENCOUNTER
Called patient to inform her that the MA had called [facility] and the [facility] facility stated her MRI would not be available until Saturday 05/25/2024. Due to this being a holiday weekend and our office being closed first avaiable clinic day is next 05/29/2024. Patient asked about notifying workcomp, informed patient I will.      Called workcomp  Asha and left VM. Informed her that patient's appt has changed from 05/24/2024 to 05/29/2024 due to [facility] not being able to have MRI ready until Saturday. Informed workcomp  that Wednesday was  first available day after the holiday.

## 2024-05-23 ENCOUNTER — TELEPHONE (OUTPATIENT)
Dept: NEUROSURGERY | Facility: CLINIC | Age: 34
End: 2024-05-23
Payer: MEDICAID

## 2024-05-23 ENCOUNTER — HOSPITAL ENCOUNTER (OUTPATIENT)
Dept: PHYSICAL THERAPY | Facility: HOSPITAL | Age: 34
Setting detail: THERAPIES SERIES
Discharge: HOME | End: 2024-05-23
Payer: COMMERCIAL

## 2024-05-23 DIAGNOSIS — S39.012S LUMBAR STRAIN, SEQUELA: Primary | ICD-10-CM

## 2024-05-23 DIAGNOSIS — M54.41 ACUTE BILATERAL LOW BACK PAIN WITH BILATERAL SCIATICA: ICD-10-CM
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
## 2024-05-23 DIAGNOSIS — R32 URINARY INCONTINENCE, UNSPECIFIED TYPE: Primary | ICD-10-CM

## 2024-05-23 DIAGNOSIS — M54.42 ACUTE BILATERAL LOW BACK PAIN WITH BILATERAL SCIATICA: ICD-10-CM

## 2024-05-23 PROCEDURE — 97530 THERAPEUTIC ACTIVITIES: CPT

## 2024-05-23 PROCEDURE — 97110 THERAPEUTIC EXERCISES: CPT

## 2024-05-23 NOTE — TELEPHONE ENCOUNTER
I called and talked to Amberly to let her know that her MRI results came in and we can make her appointment to follow-up with her MRI.  I told her we can address the medication refill and incontinence of bladder at her appointment tomorrow. Ms. Sanders stated she understood.

## 2024-05-23 NOTE — PROGRESS NOTES
Patient ID: Amberly Sanders is a 33 y.o. female is here today for follow-up for back pain of lumbar region.    Imaging: Last MRI of the lumbar spine performed on 05/21/2024    Subjective     The patient is here in regards to   Chief Complaint   Patient presents with    Back Pain    Follow-up       History of Present Illness  Amberly continues to have left-sided lumbar radiculopathy and back pain.  She is not able to lift greater than her 20 pound backpack currently without causing her to have significant issues at work she is not lifting any more than 5 pounds currently.  She also describes urinary incontinence issues that become more frequent as well as abdominal pain that travels from her back into her anterior abdomen when she tries to lift things.      While in the room and during my examination of the patient I wore a mask and eye protection.  I washed my hands before and after this patient encounter.  The patient was also wearing a mask.    The following portions of the patient's history were reviewed and updated as appropriate: allergies, current medications, past family history, past medical history, past social history, past surgical history and problem list.    Review of Systems   Constitutional:  Negative for fever.   Gastrointestinal:  Positive for abdominal pain.   Musculoskeletal:  Positive for back pain and gait problem.   Neurological:  Positive for weakness, numbness (left leg and back) and headaches. Negative for dizziness and light-headedness.        Past Medical History:   Diagnosis Date    Diabetes mellitus     Hyperlipidemia     Hypertension     Lymphedema     PCOS (polycystic ovarian syndrome)        Allergies   Allergen Reactions    Sulfa Antibiotics Hives and Unknown (See Comments)       History reviewed. No pertinent family history.    Social History     Socioeconomic History    Marital status: Single   Tobacco Use    Smoking status: Never    Smokeless tobacco: Never   Substance and  "Sexual Activity    Alcohol use: Yes     Comment: \"a drink a week\"    Drug use: Never       History reviewed. No pertinent surgical history.      Objective     Vitals:    05/24/24 0856   BP: 142/82   Pulse: 79   Resp: 16   Temp: 97.5 °F (36.4 °C)   SpO2: 96%     Body mass index is 62.32 kg/m².    Physical Exam  Constitutional:       Appearance: Normal appearance.   HENT:      Head: Normocephalic and atraumatic.   Eyes:      Extraocular Movements: Extraocular movements intact.      Conjunctiva/sclera: Conjunctivae normal.      Pupils: Pupils are equal, round, and reactive to light.   Cardiovascular:      Rate and Rhythm: Normal rate and regular rhythm.      Pulses: Normal pulses.   Pulmonary:      Breath sounds: Normal breath sounds.   Abdominal:      Palpations: Abdomen is soft.   Musculoskeletal:         General: Normal range of motion.      Cervical back: Normal range of motion and neck supple.   Skin:     General: Skin is warm and dry.   Neurological:      Mental Status: She is alert and oriented to person, place, and time.      Cranial Nerves: Cranial nerves 2-12 are intact.      Motor: Motor function is intact. No weakness or atrophy.      Coordination: Coordination is intact. Romberg sign negative. Romberg Test normal.      Gait: Gait is intact. Gait normal.      Deep Tendon Reflexes: Reflexes are normal and symmetric.      Reflex Scores:       Tricep reflexes are 2+ on the right side and 2+ on the left side.       Bicep reflexes are 2+ on the right side and 2+ on the left side.       Brachioradialis reflexes are 2+ on the right side and 2+ on the left side.       Patellar reflexes are 2+ on the right side and 2+ on the left side.       Achilles reflexes are 2+ on the right side and 2+ on the left side.  Psychiatric:         Speech: Speech normal.         Neurologic Exam     Mental Status   Oriented to person, place, and time.   Attention: normal. Concentration: normal.   Speech: speech is normal   Level of " consciousness: alert    Cranial Nerves   Cranial nerves II through XII intact.     CN III, IV, VI   Pupils are equal, round, and reactive to light.    Motor Exam   Muscle bulk: normal  Overall muscle tone: normal    Strength   Strength 5/5 except as noted.     Sensory Exam   Light touch normal.     Gait, Coordination, and Reflexes     Gait  Gait: normal    Coordination   Romberg: negative    Reflexes   Reflexes 2+ except as noted.   Right brachioradialis: 2+  Left brachioradialis: 2+  Right biceps: 2+  Left biceps: 2+  Right triceps: 2+  Left triceps: 2+  Right patellar: 2+  Left patellar: 2+  Right achilles: 2+  Left achilles: 2+      Assessment & Plan   Independent Review of Radiographic Studies:      I personally reviewed the images from the following studies.    MR: MRI of the lumbar spine wo contrast was reviewed and shows L5-S1 disc herniation measuring approximately 6 mm and abutting the left L5 and S1 nerve roots.  There is degenerative disc disease at L5-S1 as well as L3-4.  No evidence of cauda equina compression    Assessment/Plan: I do not see any evidence that would explain her urinary incontinence based on her lumbar MRI so I do feel that because of her urinary issues as well as her abdominal and thoracic back pain we should obtain an MRI of the thoracic spine as well to ensure that she does not have a thoracic disc herniation.  If that is negative she may need to see a urologist or general surgeon regarding hernia/bladder issue.  In the meantime, she is cleared to lift up to 15 pounds only while at work.  I think she will benefit from physical therapy as well as an epidural steroid injection regarding this disc and I mention to her that the type of disc herniation that she has has an excellent prognosis with conservative management.    Medical Decision Making:      Epidural steroid injection  Physical therapy  Thoracic spine MRI without contrast         Diagnoses and all orders for this visit:    1.  Lumbar disc herniation (Primary)  -     Epidural Block  -     Ambulatory Referral to Physical Therapy    2. Urinary incontinence, unspecified type  -     MRI Thoracic Spine Without Contrast; Future    3. Abdominal pain, unspecified abdominal location  -     MRI Thoracic Spine Without Contrast; Future    4. Lumbar radiculopathy  -     Epidural Block  -     Ambulatory Referral to Physical Therapy             Patient Instructions/Recommendations:    Follow-up in 3 months      Jj James MD  05/24/24  09:38 EDT

## 2024-05-23 NOTE — THERAPY PROGRESS REPORT/RE-CERT
Outpatient Physical Therapy Ortho Progress Note  [facility]     Patient Name: Amberly Sanders  : 1990  MRN: [id number]  Today's Date: 2024      Visit Date: 2024    Visit Dx:    ICD-10-CM ICD-9-CM   1. Lumbar strain, sequela  S39.012S 905.7   2. Acute bilateral low back pain with bilateral sciatica  M54.42 724.2    M54.41 724.3   3. Injury caused by lifting, sequela  X50.0XXS 909.4       Patient Active Problem List   Diagnosis    Back pain of lumbar region with sciatica        Past Medical History:   Diagnosis Date    Diabetes mellitus     Hyperlipidemia     Hypertension     Lymphedema     PCOS (polycystic ovarian syndrome)         No past surgical history on file.                     PT Assessment/Plan       Row Name 24 0800          PT Assessment    Functional Limitations Impaired locomotion;Limitations in community activities;Performance in leisure activities;Performance in self-care ADL;Impaired gait;Decreased safety during functional activities;Limitation in home management;Limitations in functional capacity and performance;Performance in work activities  -DR     Impairments Impaired flexibility;Posture;Poor body mechanics;Pain;Muscle strength;Locomotion;Range of motion;Gait;Joint mobility;Balance;Endurance;Impaired muscle power;Joint integrity;Motor function;Peripheral nerve integrity;Sensation  -DR     Assessment Comments Amberly Sanders has been seen for 9 physical therapy sessions for acute lumbar strain.  Treatment has included therapeutic exercise, therapeutic activity, and patient education with home exercise program . Progress to physical therapy goals is fair. Pt has met 2/4 STG and 0/5 LTG. Pt completed MRI imaging yesterday and has follow up with MD on  to discuss results and will decide how to proceed based on results and discussion. 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 She has been compliant with HEP, which has been  updated throughout her visits. Pt expresses difficutly standing still for >15 minutes, which interferes with work, but can walk around/do movement to assist in discomfort. She has had increased frequency of urinary frequency accidents, causing her to wake 2x/night on average to avoid urinating in the bed. She has had 4 episodes of urinary incontinence since injury occurred. Pt can tolerate sitting for >45 minutes at this time, which is an improvement from evaluation where tolerance was 20 minutes. Pt has improved ability to activate TrA with all functional activities to assist in relieving pain. No significant change with LIA. At this time, we will hold PT visits until f/u with MD, at which time she may return if planning to continue PT with alternative adjunct. She will benefit from continued skilled physical therapy to address remaining impairments and functional limitations.  -     Please refer to paper survey for additional self-reported information No  -DR     Rehab Potential Fair  -     Patient/caregiver participated in establishment of treatment plan and goals Yes  -     Patient would benefit from skilled therapy intervention Yes  -DR        PT Plan    PT Frequency 1x/week;2x/week  -     Predicted Duration of Therapy Intervention (PT) 8-10 visits  -     Planned CPT's? PT RE-EVAL: 45287;PT THER PROC EA 15 MIN: 60964;PT THER ACT EA 15 MIN: 44250;PT MANUAL THERAPY EA 15 MIN: 60650;PT NEUROMUSC RE-EDUCATION EA 15 MIN: 14844;PT GAIT TRAINING EA 15 MIN: 11274;PT EVAL AQUA: 10793;PT AQUATIC THERAPY EA 15 MIN: 83280;PT SELF CARE/HOME MGMT/TRAIN EA 15: 62524;PT HOT OR COLD PACK TREAT MCARE;PT ELECTRICAL STIM UNATTEND: ;PT TRACTION LUMBAR: 94960  -     PT Plan Comments hold PT until f/u with Dr. James regarding MRI results and future POC; may proceed with d/c at next scheduled visit or continue addressing functional limitations  -               User Key  (r) = Recorded By, (t) = Taken By, (c) =  Cosigned By      Initials Name Provider Type    Shoaib Giraldo, PT Physical Therapist                       OP Exercises       Row Name 05/23/24 0800             Subjective    Subjective Comments Just really more concerned about my walking. I almost fell  yesterday because I am tripping over carpet easier.  -DR         Total Minutes    80950 - PT Therapeutic Exercise Minutes 15  -DR      55329 - PT Therapeutic Activity Minutes 23  -DR         Exercise 1    Exercise Name 1 Clinic walk warm up  -DR      Cueing 1 Verbal  -DR      Time 1 2 laps around clinic  -DR         Exercise 8    Exercise Name 8 Standing PPT  -DR      Cueing 8 Verbal;Demo  -DR      Sets 8 1  -DR      Reps 8 20  -DR      Time 8 5s  -DR         Exercise 10    Exercise Name 10 AR press  -DR      Cueing 10 Verbal;Demo  -DR      Sets 10 1  -DR      Reps 10 15  -DR      Time 10 GTB  -DR      Additional Comments TrA  -DR         Exercise 14    Exercise Name 14 Standing GS  -DR      Cueing 14 Verbal;Demo  -DR      Reps 14 20  -DR      Time 14 5s  -DR                User Key  (r) = Recorded By, (t) = Taken By, (c) = Cosigned By      Initials Name Provider Type    Shoaib Giraldo, PT Physical Therapist                                  PT OP Goals       Row Name 05/23/24 0800          PT Short Term Goals    STG Date to Achieve 05/25/24  -     STG 1 Pt. will be independent with initial HEP to improve self-management of condition.  -     STG 1 Progress Met  -     STG 2 Patient will demonstrate proper log roll mechanics with little to no cues when getting on/off treatment table to demonstrate improved mechanics and decreased strain on lumbar spine.  -     STG 2 Progress Met  -     STG 3 Pt will improve lumbar ROM with </=2/10 pain in all cardinal planes for improved mobility and participation in ADLs.  -     STG 3 Progress Ongoing  -     STG 4 Patient will improve sitting tolerance from 20 min to >1 hour without LBP to improve  participation in job activities.  -     STG 4 Progress Progressing;Ongoing  -     STG 4 Progress Comments 45-50 minutes  -        Long Term Goals    LTG Date to Achieve 06/24/24  -     LTG 1 Pt. will be independent with advanced HEP to improve long term independence with self management of condition.  -     LTG 1 Progress Ongoing;Progressing  -     LTG 2 Pt. will score </= 20% on Modified Oswestry to indicate improved perception of disability.  -     LTG 2 Progress Ongoing  -     LTG 3 Pt. will demonstrate proper TrA engagement in standing and dynamic movements to improve lumbopelvic stability.  -     LTG 3 Progress Ongoing  -     LTG 4 Patient will improve ability to sleep through the night without sleep disturbances related to back pain to improve overall sleep quality and quality of life.  -     LTG 4 Progress Ongoing  -     LTG 4 Progress Comments 2x/night d/t urinary urgency  -     LTG 5 Patient will improve standing tolerance from 10 min to >1 hour without LBP to improve participation in job activities.  -     LTG 5 Progress Ongoing;Progressing  -     LTG 5 Progress Comments 15 min  -               User Key  (r) = Recorded By, (t) = Taken By, (c) = Cosigned By      Initials Name Provider Type    Shoaib Giraldo, PT Physical Therapist                    Therapy Education  Education Details: updated full HEP; reviewed frequency and ways to progress.  Given: HEP, Symptoms/condition management, Pain management, Posture/body mechanics, Mobility training  Program: Reinforced, Progressed  How Provided: Verbal, Demonstration, Written  Provided to: Patient  Level of Understanding: Teach back education performed, Verbalized, Demonstrated       Modified Oswestry  Modified Oswestry Score/Comments: 28/50= 56%      Time Calculation:   Start Time: 0803  Stop Time: 0841  Time Calculation (min): 38 min  Timed Charges  28243 - PT Therapeutic Exercise Minutes: 15  21975 - PT Therapeutic  Activity Minutes: 23  Total Minutes  Timed Charges Total Minutes: 38   Total Minutes: 38  Therapy Charges for Today       Code Description Service Date Service Provider Modifiers Qty    97091688468 HC PT THER PROC EA 15 MIN 5/23/2024 Shoaib Solomon, PT GP 1    [id number]  PT THERAPEUTIC ACT EA 15 MIN 5/23/2024 Shoaib Solomon, PT GP 2            PT G-Codes  Modified Oswestry Score/Comments: 28/50= 56%         Shoaib Solomon, PT  5/23/2024

## 2024-05-23 NOTE — TELEPHONE ENCOUNTER
PT'S NURSE JULIA CALLED: FYI-STATES PT TOLD HER TODAY THAT SHE IS HAVING URINARY INCONTINENCE-STATES IS STARTED AROUND 5/9/24-SHE WAS SEEN IN OFFICE ON 5/9/24 (DID NOT SEE IT MENTIONED IN OFFICE NOTE) PLEASE ADVISE! THANKS!    MRI WAS COMPLETED YESTERDAY AT [facility].       PT CONTACT: [phone]

## 2024-05-24 ENCOUNTER — OFFICE VISIT (OUTPATIENT)
Dept: NEUROSURGERY | Facility: CLINIC | Age: 34
End: 2024-05-24
Payer: COMMERCIAL

## 2024-05-24 VITALS
DIASTOLIC BLOOD PRESSURE: 82 MMHG | HEART RATE: 79 BPM | WEIGHT: 293 LBS | SYSTOLIC BLOOD PRESSURE: 142 MMHG | HEIGHT: 69 IN | RESPIRATION RATE: 16 BRPM | TEMPERATURE: 97.5 F | OXYGEN SATURATION: 96 % | BODY MASS INDEX: 43.4 KG/M2

## 2024-05-24 DIAGNOSIS — M54.16 LUMBAR RADICULOPATHY: ICD-10-CM

## 2024-05-24 DIAGNOSIS — R10.9 ABDOMINAL PAIN, UNSPECIFIED ABDOMINAL LOCATION: ICD-10-CM

## 2024-05-24 DIAGNOSIS — R32 URINARY INCONTINENCE, UNSPECIFIED TYPE: ICD-10-CM

## 2024-05-24 DIAGNOSIS — M51.26 LUMBAR DISC HERNIATION: Primary | ICD-10-CM

## 2024-05-24 RX ORDER — NAPROXEN 500 MG/1
TABLET ORAL
COMMUNITY
Start: 2024-04-26

## 2024-05-28 ENCOUNTER — TELEPHONE (OUTPATIENT)
Dept: NEUROSURGERY | Facility: CLINIC | Age: 34
End: 2024-05-28
Payer: MEDICAID

## 2024-05-28 NOTE — TELEPHONE ENCOUNTER
Called patient. Informed patient that  addened note to have her work specifics. The letter is ready. Patient will .    Received email from sandi Monday dated 05/27/2024 when we were out off office to send letter. Called and left VM with sandi that I have faxed the office note addend, letter, and referrals that need authorization to her. Asked if she could please approve for patient. Please see media.

## 2024-05-30 ENCOUNTER — APPOINTMENT (OUTPATIENT)
Dept: PHYSICAL THERAPY | Facility: HOSPITAL | Age: 34
End: 2024-05-30
Payer: COMMERCIAL

## 2024-06-05 ENCOUNTER — TELEPHONE (OUTPATIENT)
Dept: NEUROSURGERY | Facility: CLINIC | Age: 34
End: 2024-06-05
Payer: MEDICAID

## 2024-06-05 NOTE — TELEPHONE ENCOUNTER
Caller: CHANDRA NURSE       Best call back number: [phone]      Requested date: WEEK OF 7-8 OR WEEK OF 7-15-IN THE AFTERNOON     If rescheduling, when is the original appointment: 8-23-24     Additional notes:PT WILL BE FINISHED WITH PHYSICAL THERAPY THE WEEK OF JULY 8TH AND HER MRI WILL BE DONE ON  6-7-24.  WONDERING IF SHE CAN BE SCHEDULED SOONER.  PER OVN 5-28-24 FOLLOW UP IN 3 MONTHS.

## 2024-06-06 ENCOUNTER — TELEPHONE (OUTPATIENT)
Dept: NEUROSURGERY | Facility: CLINIC | Age: 34
End: 2024-06-06
Payer: MEDICAID

## 2024-06-06 DIAGNOSIS — M54.16 LUMBAR RADICULOPATHY: Primary | ICD-10-CM

## 2024-06-06 RX ORDER — NAPROXEN 500 MG/1
500 TABLET ORAL 2 TIMES DAILY WITH MEALS
Qty: 60 TABLET | Refills: 1 | Status: SHIPPED | OUTPATIENT
Start: 2024-06-06

## 2024-06-06 NOTE — TELEPHONE ENCOUNTER
Called patient and left VM. Asked patient to call me back in office to reschedule her follow up appt per .

## 2024-06-06 NOTE — TELEPHONE ENCOUNTER
PT CALLED BACK-UNABLE TO TRANSFER D/T STAFF IN CLINIC-PLEASE CALL PT BACK TO RESCHEDULE.       PT CONTACT: [phone]  BEST TIME TO CALL: ANYTIME

## 2024-06-06 NOTE — TELEPHONE ENCOUNTER
Called patient. Informed her  stated he will refill her naproxen (NAPROSYN) 500 MG tablet [5393] (Order [id number]). Sent message to MA and  to refill.

## 2024-06-18 ENCOUNTER — HOSPITAL ENCOUNTER (OUTPATIENT)
Dept: PHYSICAL THERAPY | Facility: HOSPITAL | Age: 34
Setting detail: THERAPIES SERIES
Discharge: HOME | End: 2024-06-18
Payer: COMMERCIAL
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
## 2024-06-18 DIAGNOSIS — S39.012S LUMBAR STRAIN, SEQUELA: Primary | ICD-10-CM

## 2024-06-18 DIAGNOSIS — M54.41 ACUTE BILATERAL LOW BACK PAIN WITH BILATERAL SCIATICA: ICD-10-CM

## 2024-06-18 DIAGNOSIS — M54.42 ACUTE BILATERAL LOW BACK PAIN WITH BILATERAL SCIATICA: ICD-10-CM

## 2024-06-18 PROCEDURE — 97110 THERAPEUTIC EXERCISES: CPT

## 2024-06-18 PROCEDURE — 97530 THERAPEUTIC ACTIVITIES: CPT

## 2024-06-18 NOTE — THERAPY TREATMENT NOTE
Outpatient Physical Therapy Ortho Treatment Note  [facility]     Patient Name: Amberly Sanders  : 1990  MRN: [id number]  Today's Date: 2024      Visit Date: 2024    Visit Dx:    ICD-10-CM ICD-9-CM   1. Lumbar strain, sequela  S39.012S 905.7   2. Acute bilateral low back pain with bilateral sciatica  M54.42 724.2    M54.41 724.3   3. Injury caused by lifting, sequela  X50.0XXS 909.4       Patient Active Problem List   Diagnosis    Back pain of lumbar region with sciatica    Abdominal pain    Lumbar disc herniation    Urinary incontinence    Lumbar radiculopathy        Past Medical History:   Diagnosis Date    Diabetes mellitus     Hyperlipidemia     Hypertension     Lymphedema     PCOS (polycystic ovarian syndrome)         No past surgical history on file.                     PT Assessment/Plan       Row Name 24 0900          PT Assessment    Assessment Comments Ms. Sanders returns to PT reporting much improved symptoms, with tolerance to sitting now at 1.5 hours, walking for longer periods, and standing still for about 10 minutes. Pt had lumbar MRI completed, showing L5-S1 disc herniating, which is abutting the left L5 and S1 nerve roots and DDD at L5-S1 as well as L3-4. Pt has upcoming lumbar epidural  and f/u with Dr. James . Ambulated 2 laps around clinic for dynamic warm up, then followed with all standing exercises today to focus on standing endurance and tolerance with PPT completion for active rest. Added monster walks, mehrdad mehrdad, and alternating step taps to tall cone to focus on improving gait mechanics, strengthening hip flexors and girdle. No c/o pain or discomfort throughout entire treatment. Discussed activity modifications and current protocol from MD. Pt remains appropriate for skilled PT at this time.  -        PT Plan    PT Plan Comments assess tolerance to last visit; continue standing exercises if able, completing PPT for active rest. standing HR, mini  step ups, modified mountain climbers at wall, STS with OH press?  -DR               User Key  (r) = Recorded By, (t) = Taken By, (c) = Cosigned By      Initials Name Provider Type    Shoaib Giraldo, PT Physical Therapist                       OP Exercises       Row Name 06/18/24 0800             Subjective    Subjective Comments Things are better. I can sit now for about 1.5 hours without any discomfort, walking feels better. Only thing is my tripping over my feet.  -DR         Total Minutes    91477 - PT Therapeutic Exercise Minutes 23  -DR      75329 - PT Therapeutic Activity Minutes 17  -DR         Exercise 1    Exercise Name 1 Clinic walk warm up  -DR      Cueing 1 Verbal  -DR      Time 1 2 laps around clinic  -DR         Exercise 2    Exercise Name 2 mehrdad mehrdad  -DR      Cueing 2 Demo  -DR      Sets 2 1  -DR      Reps 2 10  -DR         Exercise 5    Exercise Name 5 side steps  -DR      Cueing 5 Demo  -DR      Sets 5 2 laps  -DR      Reps 5 YTB at ankles  -DR      Time 5 small steps- cues for toes fwd  -DR         Exercise 6    Exercise Name 6 alt step taps to cone  -DR      Cueing 6 Verbal;Demo  -DR      Sets 6 2  -DR      Reps 6 10  -DR         Exercise 8    Exercise Name 8 Standing PPT  -DR      Cueing 8 Verbal;Demo  -DR      Sets 8 1  -DR      Reps 8 10  -DR      Time 8 5s  -DR      Additional Comments active rest  -DR         Exercise 9    Exercise Name 9 monster walks  -DR      Cueing 9 Verbal;Demo  -DR      Reps 9 2 laps  -DR      Time 9 YTB at ankles  -DR         Exercise 10    Exercise Name 10 AR press  -DR      Cueing 10 Verbal;Demo  -DR      Sets 10 2  -DR      Reps 10 10  -DR      Time 10 GTB  -DR      Additional Comments TrA  -DR         Exercise 11    Exercise Name 11 standing HS curls  -DR      Cueing 11 Verbal;Demo  -DR      Sets 11 2e  -DR      Reps 11 10  -DR                User Key  (r) = Recorded By, (t) = Taken By, (c) = Cosigned By      Initials Name Provider Type    Shoaib Giraldo,  PT Physical Therapist                                  PT OP Goals       Row Name 06/18/24 0800          PT Short Term Goals    STG Date to Achieve 05/25/24  -     STG 1 Pt. will be independent with initial HEP to improve self-management of condition.  -DR     STG 1 Progress Met  -     STG 2 Patient will demonstrate proper log roll mechanics with little to no cues when getting on/off treatment table to demonstrate improved mechanics and decreased strain on lumbar spine.  -DR     STG 2 Progress Met  -     STG 3 Pt will improve lumbar ROM with </=2/10 pain in all cardinal planes for improved mobility and participation in ADLs.  -DR     STG 3 Progress Ongoing  -DR     STG 4 Patient will improve sitting tolerance from 20 min to >1 hour without LBP to improve participation in job activities.  -     STG 4 Progress Progressing;Ongoing  -DR        Long Term Goals    LTG Date to Achieve 06/24/24  -     LTG 1 Pt. will be independent with advanced HEP to improve long term independence with self management of condition.  -     LTG 1 Progress Ongoing;Progressing  -     LTG 2 Pt. will score </= 20% on Modified Oswestry to indicate improved perception of disability.  -     LTG 2 Progress Ongoing  -DR     LTG 3 Pt. will demonstrate proper TrA engagement in standing and dynamic movements to improve lumbopelvic stability.  -     LTG 3 Progress Ongoing  -     LTG 4 Patient will improve ability to sleep through the night without sleep disturbances related to back pain to improve overall sleep quality and quality of life.  -     LTG 4 Progress Ongoing  -     LTG 5 Patient will improve standing tolerance from 10 min to >1 hour without LBP to improve participation in job activities.  -     LTG 5 Progress Ongoing;Progressing  -               User Key  (r) = Recorded By, (t) = Taken By, (c) = Cosigned By      Initials Name Provider Type    Shoaib Giraldo, PT Physical Therapist                    Therapy  Education  Education Details: reviewed activity modifications  Given: Symptoms/condition management  Program: Reinforced  How Provided: Verbal  Provided to: Patient  Level of Understanding: Teach back education performed, Verbalized              Time Calculation:   Start Time: 0851  Stop Time: 0931  Time Calculation (min): 40 min  Timed Charges  67224 - PT Therapeutic Exercise Minutes: 23  96196 - PT Therapeutic Activity Minutes: 17  Total Minutes  Timed Charges Total Minutes: 40   Total Minutes: 40  Therapy Charges for Today       Code Description Service Date Service Provider Modifiers Qty    [id number]  PT THER PROC EA 15 MIN 6/18/2024 Shoaib Solomon, PT GP 2    [id number]  PT THERAPEUTIC ACT EA 15 MIN 6/18/2024 Shoaib Solomon, PT GP 1                      Shoaib Solomon PT  6/18/2024

## 2024-06-20 ENCOUNTER — HOSPITAL ENCOUNTER (OUTPATIENT)
Dept: PHYSICAL THERAPY | Facility: HOSPITAL | Age: 34
Setting detail: THERAPIES SERIES
Discharge: HOME | End: 2024-06-20
Payer: MEDICAID

## 2024-06-20 DIAGNOSIS — M54.42 ACUTE BILATERAL LOW BACK PAIN WITH BILATERAL SCIATICA: ICD-10-CM

## 2024-06-20 DIAGNOSIS — M54.41 ACUTE BILATERAL LOW BACK PAIN WITH BILATERAL SCIATICA: ICD-10-CM
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
## 2024-06-20 DIAGNOSIS — S39.012S LUMBAR STRAIN, SEQUELA: Primary | ICD-10-CM

## 2024-06-20 PROCEDURE — 97110 THERAPEUTIC EXERCISES: CPT

## 2024-06-20 NOTE — THERAPY TREATMENT NOTE
Outpatient Physical Therapy Ortho Treatment Note  [facility]     Patient Name: Amberly Sanders  : 1990  MRN: [id number]  Today's Date: 2024      Visit Date: 2024    Visit Dx:    ICD-10-CM ICD-9-CM   1. Lumbar strain, sequela  S39.012S 905.7   2. Acute bilateral low back pain with bilateral sciatica  M54.42 724.2    M54.41 724.3   3. Injury caused by lifting, sequela  X50.0XXS 909.4       Patient Active Problem List   Diagnosis    Back pain of lumbar region with sciatica    Abdominal pain    Lumbar disc herniation    Urinary incontinence    Lumbar radiculopathy        Past Medical History:   Diagnosis Date    Diabetes mellitus     Hyperlipidemia     Hypertension     Lymphedema     PCOS (polycystic ovarian syndrome)         No past surgical history on file.                     PT Assessment/Plan       Row Name 24 1400          PT Assessment    Assessment Comments Ms. Sanders returns to PT reporting some soreness after last visit, but has fully diminished today. Pt continues to tolerate a full treatment in standing, adding standing HR, hip abduction, fwd step ups, tandem stance and mini squats. Difficulty with hip abduction, with notable hip weakness attempting to complete movement through compensation. Overall, pt is progressing well and will continue to benefit from skilled PT at this time.  -        PT Plan    PT Plan Comments continue focusing on standing endurance and strengthening- adding tandem walking, shoulder ext with TrA (maybe march if tolerating)?  -               User Key  (r) = Recorded By, (t) = Taken By, (c) = Cosigned By      Initials Name Provider Type    Shoaib Giraldo, PT Physical Therapist                       OP Exercises       Row Name 24 1300             Subjective    Subjective Comments I was sore after last time but much better now.  -DR         Total Minutes    50046 - PT Therapeutic Exercise Minutes 40  -DR         Exercise 1    Exercise  "Name 1 Clinic walk warm up  -DR      Cueing 1 Verbal  -DR      Time 1 2 laps around clinic  -DR         Exercise 2    Exercise Name 2 tandem stance  -DR      Cueing 2 Verbal;Demo  -DR      Sets 2 1e  -DR      Reps 2 20s  -DR         Exercise 3    Exercise Name 3 4\" step ups  -DR      Cueing 3 Verbal;Demo  -DR      Sets 3 2  -DR      Reps 3 10  -DR         Exercise 4    Exercise Name 4 standing HR  -DR      Cueing 4 Verbal;Demo  -DR      Sets 4 2  -DR      Reps 4 10  -DR         Exercise 5    Exercise Name 5 side steps  -DR      Cueing 5 Demo  -DR      Sets 5 2 laps  -DR      Reps 5 YTB at ankles  -DR      Time 5 small steps- cues for toes fwd  -DR         Exercise 6    Exercise Name 6 alt step taps to cone  -DR      Cueing 6 Verbal;Demo  -DR      Sets 6 2  -DR      Reps 6 10  -DR         Exercise 7    Exercise Name 7 hip abduction  -DR      Cueing 7 Demo  -DR      Sets 7 2e  -DR      Reps 7 10  -DR      Time 7 standing on 4\" step  -DR         Exercise 8    Exercise Name 8 Standing PPT  -DR      Cueing 8 Verbal;Demo  -DR      Sets 8 1  -DR      Reps 8 10  -DR      Time 8 5s  -DR      Additional Comments active rest  -DR         Exercise 9    Exercise Name 9 monster walks  -DR      Cueing 9 Verbal;Demo  -DR      Reps 9 2 laps  -DR      Time 9 YTB at ankles  -DR         Exercise 11    Exercise Name 11 standing HS curls  -DR      Cueing 11 Verbal;Demo  -DR      Sets 11 2e  -DR      Reps 11 10  -DR         Exercise 12    Exercise Name 12 mini squats  -DR      Cueing 12 Verbal;Demo  -DR      Reps 12 1  -DR      Time 12 10  -DR                User Key  (r) = Recorded By, (t) = Taken By, (c) = Cosigned By      Initials Name Provider Type    Shoaib Giraldo, PT Physical Therapist                                  PT OP Goals       Row Name 06/20/24 1400          PT Short Term Goals    STG Date to Achieve 05/25/24  -DR     STG 1 Pt. will be independent with initial HEP to improve self-management of condition.  -DR     STG " 1 Progress Met  -     STG 2 Patient will demonstrate proper log roll mechanics with little to no cues when getting on/off treatment table to demonstrate improved mechanics and decreased strain on lumbar spine.  -DR     STG 2 Progress Met  -     STG 3 Pt will improve lumbar ROM with </=2/10 pain in all cardinal planes for improved mobility and participation in ADLs.  -DR     STG 3 Progress Ongoing  -DR     STG 4 Patient will improve sitting tolerance from 20 min to >1 hour without LBP to improve participation in job activities.  -     STG 4 Progress Progressing;Ongoing  -DR        Long Term Goals    LTG Date to Achieve 06/24/24  -     LTG 1 Pt. will be independent with advanced HEP to improve long term independence with self management of condition.  -     LTG 1 Progress Ongoing;Progressing  -     LTG 2 Pt. will score </= 20% on Modified Oswestry to indicate improved perception of disability.  -     LTG 2 Progress Ongoing  -DR     LTG 3 Pt. will demonstrate proper TrA engagement in standing and dynamic movements to improve lumbopelvic stability.  -     LTG 3 Progress Ongoing  -DR     LTG 4 Patient will improve ability to sleep through the night without sleep disturbances related to back pain to improve overall sleep quality and quality of life.  -     LTG 4 Progress Ongoing  -DR     LTG 5 Patient will improve standing tolerance from 10 min to >1 hour without LBP to improve participation in job activities.  -     LTG 5 Progress Ongoing;Progressing  -DR               User Key  (r) = Recorded By, (t) = Taken By, (c) = Cosigned By      Initials Name Provider Type    Shoaib Giraldo, PT Physical Therapist                    Therapy Education  Given: Symptoms/condition management  Program: Reinforced  How Provided: Verbal  Provided to: Patient  Level of Understanding: Teach back education performed, Verbalized              Time Calculation:   Start Time: 1318  Stop Time: 1358  Time Calculation  (min): 40 min  Timed Charges  23685 - PT Therapeutic Exercise Minutes: 40  Total Minutes  Timed Charges Total Minutes: 40   Total Minutes: 40  Therapy Charges for Today       Code Description Service Date Service Provider Modifiers Qty    [id number]  PT THER PROC EA 15 MIN 6/20/2024 Shoaib Solomon, PT GP 3                      Shoaib Solomon, PT  6/20/2024

## 2024-06-27 ENCOUNTER — HOSPITAL ENCOUNTER (OUTPATIENT)
Dept: PHYSICAL THERAPY | Facility: HOSPITAL | Age: 34
Setting detail: THERAPIES SERIES
Discharge: HOME | End: 2024-06-27
Payer: COMMERCIAL

## 2024-06-27 DIAGNOSIS — M54.42 ACUTE BILATERAL LOW BACK PAIN WITH BILATERAL SCIATICA: ICD-10-CM

## 2024-06-27 DIAGNOSIS — M54.41 ACUTE BILATERAL LOW BACK PAIN WITH BILATERAL SCIATICA: ICD-10-CM
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
## 2024-06-27 DIAGNOSIS — S39.012S LUMBAR STRAIN, SEQUELA: Primary | ICD-10-CM

## 2024-06-27 PROCEDURE — 97110 THERAPEUTIC EXERCISES: CPT

## 2024-06-27 NOTE — THERAPY TREATMENT NOTE
Outpatient Physical Therapy Ortho Treatment Note  [facility]     Patient Name: Amberly Sanders  : 1990  MRN: [id number]  Today's Date: 2024      Visit Date: 2024    Visit Dx:    ICD-10-CM ICD-9-CM   1. Lumbar strain, sequela  S39.012S 905.7   2. Acute bilateral low back pain with bilateral sciatica  M54.42 724.2    M54.41 724.3   3. Injury caused by lifting, sequela  X50.0XXS 909.4       Patient Active Problem List   Diagnosis    Back pain of lumbar region with sciatica    Abdominal pain    Lumbar disc herniation    Urinary incontinence    Lumbar radiculopathy        Past Medical History:   Diagnosis Date    Diabetes mellitus     Hyperlipidemia     Hypertension     Lymphedema     PCOS (polycystic ovarian syndrome)         No past surgical history on file.                     PT Assessment/Plan       Row Name 24 1000          PT Assessment    Assessment Comments Amberly Sanders is a 33 year old female returning for physical therapy treatment session reporting overall improvement in symptoms. She is able to ambulate for 4.5 minutes before becoming fatigued, or c/o increased pain. Continued therex with focus on form and posture, muscle recruitment through abdominals/glut to offload spine. Most difficulty with standing hip ABD, as pt. Anteriorly rotates stance hip, with small range. Tactile cues effective. Pt. Remains a good candidate for skilled PT.  -ER        PT Plan    PT Plan Comments tandem walking, shoulder extension, mini lunge with chest opener, exaggerated march (consider small range off step to start?)  -ER               User Key  (r) = Recorded By, (t) = Taken By, (c) = Cosigned By      Initials Name Provider Type    ER Carlyn Price, PT Physical Therapist                       OP Exercises       Row Name 24 0900             Subjective    Subjective Comments I am feeling better  -ER         Total Minutes    28090 - PT Therapeutic Exercise Minutes 40  -ER          "Exercise 1    Exercise Name 1 Clinic walk warm up  -ER      Cueing 1 Verbal  -ER      Time 1 4.5 minutes  -ER         Exercise 3    Exercise Name 3 4\" step ups  -ER      Cueing 3 Verbal;Demo  -ER      Sets 3 2  -ER      Reps 3 10  -ER         Exercise 4    Exercise Name 4 standing HR  -ER      Cueing 4 Verbal;Demo  -ER      Sets 4 2  -ER      Reps 4 10  -ER         Exercise 5    Exercise Name 5 side steps  -ER      Cueing 5 Demo  -ER      Sets 5 2 laps  -ER      Reps 5 YTB at ankles  -ER      Time 5 small steps- cues for toes fwd  -ER         Exercise 6    Exercise Name 6 alt step taps to cone  -ER      Cueing 6 Verbal;Demo  -ER      Sets 6 2  -ER      Reps 6 10  -ER         Exercise 8    Exercise Name 8 Standing PPT  -ER      Cueing 8 Verbal;Demo  -ER      Sets 8 1  -ER      Reps 8 10  -ER      Time 8 5s  -ER      Additional Comments active rest  -ER         Exercise 9    Exercise Name 9 monster walks  -ER      Cueing 9 Verbal;Demo  -ER      Reps 9 2 laps  -ER      Time 9 YTB at ankles  -ER         Exercise 11    Exercise Name 11 standing HS curls  -ER      Cueing 11 Verbal;Demo  -ER      Sets 11 2e  -ER      Reps 11 10  -ER         Exercise 12    Exercise Name 12 mini squats  -ER      Cueing 12 Verbal;Demo  -ER      Reps 12 1  -ER      Time 12 10  -ER                User Key  (r) = Recorded By, (t) = Taken By, (c) = Cosigned By      Initials Name Provider Type    ER Carlyn Price, PT Physical Therapist                                  PT OP Goals       Row Name 06/27/24 1000          PT Short Term Goals    STG Date to Achieve 05/25/24  -ER     STG 1 Pt. will be independent with initial HEP to improve self-management of condition.  -ER     STG 1 Progress Met  -ER     STG 2 Patient will demonstrate proper log roll mechanics with little to no cues when getting on/off treatment table to demonstrate improved mechanics and decreased strain on lumbar spine.  -ER     STG 2 Progress Met  -ER     STG 3 Pt will improve lumbar " ROM with </=2/10 pain in all cardinal planes for improved mobility and participation in ADLs.  -ER     STG 3 Progress Ongoing  -ER     STG 4 Patient will improve sitting tolerance from 20 min to >1 hour without LBP to improve participation in job activities.  -ER     STG 4 Progress Progressing;Ongoing  -ER        Long Term Goals    LTG Date to Achieve 06/24/24  -ER     LTG 1 Pt. will be independent with advanced HEP to improve long term independence with self management of condition.  -ER     LTG 1 Progress Ongoing;Progressing  -ER     LTG 2 Pt. will score </= 20% on Modified Oswestry to indicate improved perception of disability.  -ER     LTG 2 Progress Ongoing  -ER     LTG 3 Pt. will demonstrate proper TrA engagement in standing and dynamic movements to improve lumbopelvic stability.  -ER     LTG 3 Progress Ongoing  -ER     LTG 4 Patient will improve ability to sleep through the night without sleep disturbances related to back pain to improve overall sleep quality and quality of life.  -ER     LTG 4 Progress Ongoing  -ER     LTG 5 Patient will improve standing tolerance from 10 min to >1 hour without LBP to improve participation in job activities.  -ER     LTG 5 Progress Ongoing;Progressing  -ER               User Key  (r) = Recorded By, (t) = Taken By, (c) = Cosigned By      Initials Name Provider Type    ER Carlyn Price, PT Physical Therapist                    Therapy Education  Given: Posture/body mechanics, Symptoms/condition management, Pain management  Program: Reinforced  How Provided: Verbal, Demonstration  Provided to: Patient  Level of Understanding: Teach back education performed, Verbalized, Demonstrated              Time Calculation:   Start Time: 0945  Stop Time: 1025  Time Calculation (min): 40 min  Total Timed Code Minutes- PT: 40 minute(s)  Timed Charges  91296 - PT Therapeutic Exercise Minutes: 40  Total Minutes  Timed Charges Total Minutes: 40   Total Minutes: 40  Therapy Charges for Today        Code Description Service Date Service Provider Modifiers Qty    [id number] HC PT THER PROC EA 15 MIN 6/27/2024 Carlyn Price, PT GP 3                      Carlyn Price, PT  6/27/2024

## 2024-07-17 ENCOUNTER — TELEPHONE (OUTPATIENT)
Dept: NEUROSURGERY | Facility: CLINIC | Age: 34
End: 2024-07-17

## 2024-07-17 NOTE — TELEPHONE ENCOUNTER
Called patient. Informed her due to  having an emergency surgery to perform, her appt needed to be rescheduled. Rescheduled appt 07/31/2024.

## 2024-07-30 ENCOUNTER — TELEPHONE (OUTPATIENT)
Dept: NEUROSURGERY | Facility: CLINIC | Age: 34
End: 2024-07-30
Payer: MEDICAID

## 2024-07-30 NOTE — TELEPHONE ENCOUNTER
I called and left  for Amberly to see if she could come in at an earlier [facility] time to see Dr. James at 9:45am on 07/31/2024.

## 2024-07-30 NOTE — PROGRESS NOTES
"Patient ID: Amberly Sanders is a 33 y.o. female is here today for follow-up lumbar disc herniation, urinary incontinence and abdominal pain.    Imaging: MRI of the thoracic spine performed on 06/07/2024    Subjective     The patient is here in regards to   Chief Complaint   Patient presents with    Back Pain    Follow-up       History of Present Illness  He is overall doing better.  Her pain she describes as a 3/10 where previously was much higher.  Some days she is the pain is not significant enough for need to take naproxen.  She no longer has any significant radiculopathy although she does have some residual numbness in her left leg.  No bowel or bladder issues.      While in the room and during my examination of the patient I wore a mask and eye protection.  I washed my hands before and after this patient encounter.  The patient was also wearing a mask.    The following portions of the patient's history were reviewed and updated as appropriate: allergies, current medications, past family history, past medical history, past social history, past surgical history and problem list.    Review of Systems   Constitutional:  Negative for fever.   Genitourinary:  Negative for difficulty urinating.   Musculoskeletal:  Negative for back pain and gait problem.   Neurological:  Positive for numbness (left leg). Negative for dizziness, weakness, light-headedness and headaches.        Past Medical History:   Diagnosis Date    Diabetes mellitus     Hyperlipidemia     Hypertension     Lymphedema     PCOS (polycystic ovarian syndrome)        Allergies   Allergen Reactions    Sulfa Antibiotics Hives and Unknown (See Comments)       History reviewed. No pertinent family history.    Social History     Socioeconomic History    Marital status: Single   Tobacco Use    Smoking status: Never    Smokeless tobacco: Never   Substance and Sexual Activity    Alcohol use: Yes     Comment: \"a drink a week\"    Drug use: Never       History " reviewed. No pertinent surgical history.      Objective     Vitals:    07/31/24 1420   BP: 134/70   Pulse: 100   Resp: 16   SpO2: 94%     Body mass index is 64.83 kg/m².    Physical Exam  Constitutional:       Appearance: Normal appearance.   HENT:      Head: Normocephalic and atraumatic.   Eyes:      Extraocular Movements: Extraocular movements intact.      Conjunctiva/sclera: Conjunctivae normal.      Pupils: Pupils are equal, round, and reactive to light.   Cardiovascular:      Rate and Rhythm: Normal rate and regular rhythm.      Pulses: Normal pulses.   Pulmonary:      Breath sounds: Normal breath sounds.   Abdominal:      Palpations: Abdomen is soft.   Musculoskeletal:         General: Normal range of motion.      Cervical back: Normal range of motion and neck supple.   Skin:     General: Skin is warm and dry.   Neurological:      Mental Status: She is alert and oriented to person, place, and time.      Cranial Nerves: Cranial nerves 2-12 are intact.      Motor: Motor function is intact. No weakness or atrophy.      Coordination: Coordination is intact. Romberg sign negative. Romberg Test normal.      Gait: Gait is intact. Gait normal.      Deep Tendon Reflexes: Reflexes are normal and symmetric.      Reflex Scores:       Tricep reflexes are 2+ on the right side and 2+ on the left side.       Bicep reflexes are 2+ on the right side and 2+ on the left side.       Brachioradialis reflexes are 2+ on the right side and 2+ on the left side.       Patellar reflexes are 2+ on the right side and 2+ on the left side.       Achilles reflexes are 2+ on the right side and 2+ on the left side.  Psychiatric:         Speech: Speech normal.         Neurologic Exam     Mental Status   Oriented to person, place, and time.   Attention: normal. Concentration: normal.   Speech: speech is normal   Level of consciousness: alert    Cranial Nerves   Cranial nerves II through XII intact.     CN III, IV, VI   Pupils are equal, round,  and reactive to light.    Motor Exam   Muscle bulk: normal  Overall muscle tone: normal    Strength   Strength 5/5 except as noted.     Sensory Exam   Light touch normal.     Gait, Coordination, and Reflexes     Gait  Gait: normal    Coordination   Romberg: negative    Reflexes   Reflexes 2+ except as noted.   Right brachioradialis: 2+  Left brachioradialis: 2+  Right biceps: 2+  Left biceps: 2+  Right triceps: 2+  Left triceps: 2+  Right patellar: 2+  Left patellar: 2+  Right achilles: 2+  Left achilles: 2+      Assessment & Plan   Independent Review of Radiographic Studies:      I personally reviewed the images from the following studies.    MR: MRI of the thoracic spine wo contrast was reviewed and shows no disc herniations or compression of the thoracic spine.    Assessment/Plan: Had a very mild disc herniation in her lumbar spine.  The acute pain and inflammation seems to have resolved.  Does not have any focal neurological deficits.  She is cleared to return to work without restriction.  No plan for any neurosurgical intervention at this time.    Medical Decision Making:      Follow-up as needed         Diagnoses and all orders for this visit:    1. Lumbar disc herniation (Primary)             Patient Instructions/Recommendations:    Call with any questions or concerns      Jj James MD  07/31/24  14:40 EDT

## 2024-07-31 ENCOUNTER — OFFICE VISIT (OUTPATIENT)
Dept: NEUROSURGERY | Facility: CLINIC | Age: 34
End: 2024-07-31
Payer: COMMERCIAL

## 2024-07-31 VITALS
SYSTOLIC BLOOD PRESSURE: 134 MMHG | RESPIRATION RATE: 16 BRPM | HEIGHT: 69 IN | BODY MASS INDEX: 43.4 KG/M2 | OXYGEN SATURATION: 94 % | HEART RATE: 100 BPM | DIASTOLIC BLOOD PRESSURE: 70 MMHG | WEIGHT: 293 LBS

## 2024-07-31 DIAGNOSIS — M51.26 LUMBAR DISC HERNIATION: Primary | ICD-10-CM

## 2024-08-05 ENCOUNTER — TELEPHONE (OUTPATIENT)
Dept: NEUROSURGERY | Facility: CLINIC | Age: 34
End: 2024-08-05

## 2025-04-09 ENCOUNTER — HOSPITAL ENCOUNTER (EMERGENCY)
Facility: HOSPITAL | Age: 35
Discharge: HOME | End: 2025-04-09
Attending: EMERGENCY MEDICINE
Payer: MEDICAID

## 2025-04-09 ENCOUNTER — APPOINTMENT (OUTPATIENT)
Dept: GENERAL RADIOLOGY | Facility: HOSPITAL | Age: 35
End: 2025-04-09
Payer: MEDICAID

## 2025-04-09 VITALS
OXYGEN SATURATION: 100 % | RESPIRATION RATE: 16 BRPM | SYSTOLIC BLOOD PRESSURE: 154 MMHG | HEART RATE: 74 BPM | DIASTOLIC BLOOD PRESSURE: 98 MMHG | TEMPERATURE: 97.3 F

## 2025-04-09 DIAGNOSIS — R07.89 ATYPICAL CHEST PAIN: Primary | ICD-10-CM

## 2025-04-09 LAB
ALBUMIN SERPL-MCNC: 4.4 G/DL (ref 3.5–5.2)
ALBUMIN/GLOB SERPL: 1.2 G/DL
ALP SERPL-CCNC: 126 U/L (ref 39–117)
ALT SERPL W P-5'-P-CCNC: 18 U/L (ref 1–33)
ANION GAP SERPL CALCULATED.3IONS-SCNC: 10.1 MMOL/L (ref 5–15)
AST SERPL-CCNC: 19 U/L (ref 1–32)
BASOPHILS # BLD AUTO: 0.03 10*3/MM3 (ref 0–0.2)
BASOPHILS NFR BLD AUTO: 0.3 % (ref 0–1.5)
BILIRUB SERPL-MCNC: <0.2 MG/DL (ref 0–1.2)
BUN SERPL-MCNC: 10 MG/DL (ref 6–20)
BUN/CREAT SERPL: 12.5 (ref 7–25)
CALCIUM SPEC-SCNC: 9.3 MG/DL (ref 8.6–10.5)
CHLORIDE SERPL-SCNC: 104 MMOL/L (ref 98–107)
CO2 SERPL-SCNC: 25.9 MMOL/L (ref 22–29)
CREAT SERPL-MCNC: 0.8 MG/DL (ref 0.57–1)
DEPRECATED RDW RBC AUTO: 40.9 FL (ref 37–54)
EGFRCR SERPLBLD CKD-EPI 2021: 98.7 ML/MIN/1.73
EOSINOPHIL # BLD AUTO: 0.48 10*3/MM3 (ref 0–0.4)
EOSINOPHIL NFR BLD AUTO: 5.5 % (ref 0.3–6.2)
ERYTHROCYTE [DISTWIDTH] IN BLOOD BY AUTOMATED COUNT: 14.1 % (ref 12.3–15.4)
GEN 5 1HR TROPONIN T REFLEX: <6 NG/L
GLOBULIN UR ELPH-MCNC: 3.7 GM/DL
GLUCOSE SERPL-MCNC: 198 MG/DL (ref 65–99)
HCT VFR BLD AUTO: 38.5 % (ref 34–46.6)
HGB BLD-MCNC: 12.6 G/DL (ref 12–15.9)
HOLD SPECIMEN: NORMAL
HOLD SPECIMEN: NORMAL
IMM GRANULOCYTES # BLD AUTO: 0.02 10*3/MM3 (ref 0–0.05)
IMM GRANULOCYTES NFR BLD AUTO: 0.2 % (ref 0–0.5)
LIPASE SERPL-CCNC: 51 U/L (ref 13–60)
LYMPHOCYTES # BLD AUTO: 4.03 10*3/MM3 (ref 0.7–3.1)
LYMPHOCYTES NFR BLD AUTO: 46.3 % (ref 19.6–45.3)
MCH RBC QN AUTO: 26.5 PG (ref 26.6–33)
MCHC RBC AUTO-ENTMCNC: 32.7 G/DL (ref 31.5–35.7)
MCV RBC AUTO: 81.1 FL (ref 79–97)
MONOCYTES # BLD AUTO: 0.67 10*3/MM3 (ref 0.1–0.9)
MONOCYTES NFR BLD AUTO: 7.7 % (ref 5–12)
NEUTROPHILS NFR BLD AUTO: 3.48 10*3/MM3 (ref 1.7–7)
NEUTROPHILS NFR BLD AUTO: 40 % (ref 42.7–76)
NRBC BLD AUTO-RTO: 0 /100 WBC (ref 0–0.2)
PLATELET # BLD AUTO: 320 10*3/MM3 (ref 140–450)
PMV BLD AUTO: 9.8 FL (ref 6–12)
POTASSIUM SERPL-SCNC: 3.9 MMOL/L (ref 3.5–5.2)
PROT SERPL-MCNC: 8.1 G/DL (ref 6–8.5)
RBC # BLD AUTO: 4.75 10*6/MM3 (ref 3.77–5.28)
SODIUM SERPL-SCNC: 140 MMOL/L (ref 136–145)
TROPONIN T NUMERIC DELTA: NORMAL
TROPONIN T SERPL HS-MCNC: 7 NG/L
WBC NRBC COR # BLD AUTO: 8.71 10*3/MM3 (ref 3.4–10.8)
WHOLE BLOOD HOLD COAG: NORMAL
WHOLE BLOOD HOLD SPECIMEN: NORMAL

## 2025-04-09 PROCEDURE — 99284 EMERGENCY DEPT VISIT MOD MDM: CPT

## 2025-04-09 PROCEDURE — 93005 ELECTROCARDIOGRAM TRACING: CPT | Performed by: EMERGENCY MEDICINE

## 2025-04-09 PROCEDURE — 93005 ELECTROCARDIOGRAM TRACING: CPT

## 2025-04-09 PROCEDURE — 71045 X-RAY EXAM CHEST 1 VIEW: CPT

## 2025-04-09 PROCEDURE — 93010 ELECTROCARDIOGRAM REPORT: CPT | Performed by: INTERNAL MEDICINE

## 2025-04-09 PROCEDURE — 25810000003 SODIUM CHLORIDE 0.9 % SOLUTION: Performed by: EMERGENCY MEDICINE

## 2025-04-09 PROCEDURE — 84484 ASSAY OF TROPONIN QUANT: CPT | Performed by: EMERGENCY MEDICINE

## 2025-04-09 PROCEDURE — 83690 ASSAY OF LIPASE: CPT | Performed by: EMERGENCY MEDICINE

## 2025-04-09 PROCEDURE — 80053 COMPREHEN METABOLIC PANEL: CPT | Performed by: EMERGENCY MEDICINE

## 2025-04-09 PROCEDURE — 36415 COLL VENOUS BLD VENIPUNCTURE: CPT

## 2025-04-09 PROCEDURE — 85025 COMPLETE CBC W/AUTO DIFF WBC: CPT | Performed by: EMERGENCY MEDICINE

## 2025-04-09 RX ORDER — HYDROCHLOROTHIAZIDE 25 MG/1
25 TABLET ORAL DAILY
Qty: 90 TABLET | Refills: 0 | Status: SHIPPED | OUTPATIENT
Start: 2025-04-09

## 2025-04-09 RX ORDER — ASPIRIN 325 MG
325 TABLET ORAL ONCE
Status: DISCONTINUED | OUTPATIENT
Start: 2025-04-09 | End: 2025-04-09 | Stop reason: HOSPADM

## 2025-04-09 RX ORDER — SODIUM CHLORIDE 0.9 % (FLUSH) 0.9 %
10 SYRINGE (ML) INJECTION AS NEEDED
Status: DISCONTINUED | OUTPATIENT
Start: 2025-04-09 | End: 2025-04-09 | Stop reason: HOSPADM

## 2025-04-09 RX ADMIN — SODIUM CHLORIDE 1000 ML: 9 INJECTION, SOLUTION INTRAVENOUS at 08:29

## 2025-04-09 NOTE — ED NOTES
"Patient to ER via car from home for chest pain and fast heart rate after \"having a good time last night\"  "

## 2025-04-09 NOTE — Clinical Note
[facility] EMERGENCY DEPARTMENT  [address] [facility] [address]  Phone: [phone]    Amberly Sanders was seen and treated in our emergency department on 4/9/2025.  She may return to work on 04/10/2025.         Thank you for choosing [facility].    Steve Amezquita MD

## 2025-04-09 NOTE — ED PROVIDER NOTES
" EMERGENCY DEPARTMENT ENCOUNTER    Room Number:  31/31  PCP: Ramirez Auguste MD  Historian: Patient      HPI:  Chief Complaint: Chest pain rapid heart rate  A complete HPI/ROS/PMH/PSH/SH/FH are unobtainable due to: None  Context: Amberly Sanders is a 34 y.o. female who presents to the ED c/o chest pain rapid heart rate.  Patient states she drank quite a bit of alcohol last night.  Patient states this morning woke up and did feel lightheaded and dizzy.  Patient states she felt like her heart was racing.  Did have some chest discomfort.  She was not exerting herself the time.  Patient has had no prior cardiac history.  No fevers or chills.  Patient states symptoms seem to be better now.  No increased leg swelling.            PAST MEDICAL HISTORY  Active Ambulatory Problems     Diagnosis Date Noted    Back pain of lumbar region with sciatica 05/08/2024    Abdominal pain 05/24/2024    Lumbar disc herniation 05/24/2024    Urinary incontinence 05/24/2024    Lumbar radiculopathy 05/24/2024     Resolved Ambulatory Problems     Diagnosis Date Noted    No Resolved Ambulatory Problems     Past Medical History:   Diagnosis Date    Diabetes mellitus     Hyperlipidemia     Hypertension     Lymphedema     PCOS (polycystic ovarian syndrome)          PAST SURGICAL HISTORY  No past surgical history on file.      FAMILY HISTORY  No family history on file.      SOCIAL HISTORY  Social History     Socioeconomic History    Marital status: Single   Tobacco Use    Smoking status: Never    Smokeless tobacco: Never   Substance and Sexual Activity    Alcohol use: Yes     Comment: \"a drink a week\"    Drug use: Never         ALLERGIES  Sulfa antibiotics        REVIEW OF SYSTEMS  Review of Systems   Chest pain rapid heart rate      PHYSICAL EXAM  ED Triage Vitals   Temp Heart Rate Resp BP SpO2   04/09/25 0808 04/09/25 0808 04/09/25 0808 04/09/25 0814 04/09/25 0808   97.3 °F (36.3 °C) 104 16 151/97 100 %      Temp src Heart Rate " Source Patient Position BP Location FiO2 (%)   -- -- -- -- --              Physical Exam      GENERAL: no acute distress  HENT: nares patent  EYES: no scleral icterus  CV: regular rhythm, normal rate  RESPIRATORY: normal effort, lungs clear bilaterally  ABDOMEN: soft, no epigastric tenderness  MUSCULOSKELETAL: no deformity  NEURO: alert, moves all extremities, follows commands  PSYCH:  calm, cooperative  SKIN: warm, dry    Vital signs and nursing notes reviewed.          LAB RESULTS  Recent Results (from the past 24 hours)   ECG 12 Lead ED Triage Standing Order; Chest Pain    Collection Time: 04/09/25  8:12 AM   Result Value Ref Range    QT Interval 407 ms    QTC Interval 488 ms   Comprehensive Metabolic Panel    Collection Time: 04/09/25  8:15 AM    Specimen: Arm, Right; Blood   Result Value Ref Range    Glucose 198 (H) 65 - 99 mg/dL    BUN 10 6 - 20 mg/dL    Creatinine 0.80 0.57 - 1.00 mg/dL    Sodium 140 136 - 145 mmol/L    Potassium 3.9 3.5 - 5.2 mmol/L    Chloride 104 98 - 107 mmol/L    CO2 25.9 22.0 - 29.0 mmol/L    Calcium 9.3 8.6 - 10.5 mg/dL    Total Protein 8.1 6.0 - 8.5 g/dL    Albumin 4.4 3.5 - 5.2 g/dL    ALT (SGPT) 18 1 - 33 U/L    AST (SGOT) 19 1 - 32 U/L    Alkaline Phosphatase 126 (H) 39 - 117 U/L    Total Bilirubin <0.2 0.0 - 1.2 mg/dL    Globulin 3.7 gm/dL    A/G Ratio 1.2 g/dL    BUN/Creatinine Ratio 12.5 7.0 - 25.0    Anion Gap 10.1 5.0 - 15.0 mmol/L    eGFR 98.7 >60.0 mL/min/1.73   High Sensitivity Troponin T    Collection Time: 04/09/25  8:15 AM    Specimen: Arm, Right; Blood   Result Value Ref Range    HS Troponin T 7 <14 ng/L   Green Top (Gel)    Collection Time: 04/09/25  8:15 AM   Result Value Ref Range    Extra Tube Hold for add-ons.    Lavender Top    Collection Time: 04/09/25  8:15 AM   Result Value Ref Range    Extra Tube hold for add-on    Gold Top - SST    Collection Time: 04/09/25  8:15 AM   Result Value Ref Range    Extra Tube Hold for add-ons.    Light Blue Top    Collection Time:  04/09/25  8:15 AM   Result Value Ref Range    Extra Tube Hold for add-ons.    CBC Auto Differential    Collection Time: 04/09/25  8:15 AM    Specimen: Arm, Right; Blood   Result Value Ref Range    WBC 8.71 3.40 - 10.80 10*3/mm3    RBC 4.75 3.77 - 5.28 10*6/mm3    Hemoglobin 12.6 12.0 - 15.9 g/dL    Hematocrit 38.5 34.0 - 46.6 %    MCV 81.1 79.0 - 97.0 fL    MCH 26.5 (L) 26.6 - 33.0 pg    MCHC 32.7 31.5 - 35.7 g/dL    RDW 14.1 12.3 - 15.4 %    RDW-SD 40.9 37.0 - 54.0 fl    MPV 9.8 6.0 - 12.0 fL    Platelets 320 140 - 450 10*3/mm3    Neutrophil % 40.0 (L) 42.7 - 76.0 %    Lymphocyte % 46.3 (H) 19.6 - 45.3 %    Monocyte % 7.7 5.0 - 12.0 %    Eosinophil % 5.5 0.3 - 6.2 %    Basophil % 0.3 0.0 - 1.5 %    Immature Grans % 0.2 0.0 - 0.5 %    Neutrophils, Absolute 3.48 1.70 - 7.00 10*3/mm3    Lymphocytes, Absolute 4.03 (H) 0.70 - 3.10 10*3/mm3    Monocytes, Absolute 0.67 0.10 - 0.90 10*3/mm3    Eosinophils, Absolute 0.48 (H) 0.00 - 0.40 10*3/mm3    Basophils, Absolute 0.03 0.00 - 0.20 10*3/mm3    Immature Grans, Absolute 0.02 0.00 - 0.05 10*3/mm3    nRBC 0.0 0.0 - 0.2 /100 WBC   Lipase    Collection Time: 04/09/25  8:15 AM    Specimen: Arm, Right; Blood   Result Value Ref Range    Lipase 51 13 - 60 U/L   High Sensitivity Troponin T 1Hr    Collection Time: 04/09/25  9:14 AM    Specimen: Blood   Result Value Ref Range    HS Troponin T <6 <14 ng/L    Troponin T Numeric Delta         Ordered the above labs and reviewed the results.        RADIOLOGY  XR Chest 1 View  Result Date: 4/9/2025  XR CHEST 1 VW-  HISTORY: Female who is 34 years-old, chest pain  TECHNIQUE: Frontal view of the chest  COMPARISON: None available  FINDINGS: Heart, mediastinum and pulmonary vasculature are unremarkable. No focal pulmonary consolidation, pleural effusion, or pneumothorax. No acute osseous process.      No focal pulmonary consolidation. Follow-up as clinical indications persist.  This report was finalized on 4/9/2025 8:33 AM by Dr. Rey BROOKS  BOAZ Grijalva on Workstation: [id number]        Ordered the above noted radiological studies.  Chest x-ray independent interpreted by me and shows no evidence of pneumonia          PROCEDURES  Procedures      EKG          EKG time: 812  Rhythm/Rate: Normal sinus rhythm 86  P waves and MI: Normal P wave  QRS, axis: Normal QRS  ST and T waves: Normal ST-T wave    Interpreted Contemporaneously by me, independently viewed  No prior      MEDICATIONS GIVEN IN ER  Medications   sodium chloride 0.9 % flush 10 mL (has no administration in time range)   aspirin tablet 325 mg (325 mg Oral Not Given 4/9/25 0823)   sodium chloride 0.9 % bolus 1,000 mL (0 mL Intravenous Stopped 4/9/25 1006)                   MEDICAL DECISION MAKING, PROGRESS, and CONSULTS    All labs have been independently reviewed by me.  All radiology studies have been reviewed by me and I have also reviewed the radiology report.   EKG's independently viewed and interpreted by me.  Discussion below represents my analysis of pertinent findings related to patient's condition, differential diagnosis, treatment plan and final disposition.      Additional sources:  - Discussed/ obtained information from independent historians: None    - External (non-ED) record review: Epic reviewed and patient seen 2/25/2025 for diabetes    - Chronic or social conditions impacting care: None    - Shared decision making: None      Orders placed during this visit:  Orders Placed This Encounter   Procedures    XR Chest 1 View    Hardy Draw    Comprehensive Metabolic Panel    High Sensitivity Troponin T    CBC Auto Differential    Lipase    High Sensitivity Troponin T 1Hr    NPO Diet NPO Type: Strict NPO    Undress & Gown    Continuous Pulse Oximetry    Oxygen Therapy- Nasal Cannula; Titrate 1-6 LPM Per SpO2; 90 - 95%    ECG 12 Lead ED Triage Standing Order; Chest Pain    ECG 12 Lead ED Triage Standing Order; Chest Pain    Insert Peripheral IV    CBC & Differential    Green Top (Gel)     Lavender Top    Gold Top - SST    Light Blue Top         Additional orders considered but not ordered:  None        Differential diagnosis includes but is not limited to:    Reflux versus gastritis versus ACS versus pneumonia      Independent interpretation of labs, radiology studies, and discussions with consultants:  ED Course as of 04/09/25 1115   Wed Apr 09, 2025 0948 09:48 EDT  Patient presents for evaluation of chest pain.  Patient states she did drink alcohol last night.  Has been having some palpitations this morning.  States she did have some chest tightness.  Patient is EKG normal and serial troponins are negative and heart score is low.  Patient normally takes hydrochlorothiazide and she has been out for the last few weeks.  Patient's lipase normal.  Patient's chest x-ray shows no evidence of pneumonia or pneumothorax.  Will be discharged home.  Instructed to follow-up with primary provider.  Instructed to return here for any concerns. [SL]      ED Course User Index  [SL] Steve Amezquita MD               DIAGNOSIS  Final diagnoses:   Atypical chest pain         DISPOSITION  DISCHARGE    Patient discharged in stable condition.    Reviewed implications of results, diagnosis, meds, responsibility to follow up, warning signs and symptoms of possible worsening, potential complications and reasons to return to ER, including worsening symptoms    Patient/Family voiced understanding of above instructions.    Discussed plan for discharge, as there is no emergent indication for admission. Patient referred to primary care provider for BP management due to today's BP. Pt/family is agreeable and understands need for follow up and repeat testing.  Pt is aware that discharge does not mean that nothing is wrong but it indicates no emergency is present that requires admission and they must continue care with follow-up as given below or physician of their choice.     FOLLOW-UP  Ramirez Auguste MD  [address] [place] [address]  Yaniv MCKEON  [address]  [facility] [address]  [phone]    Schedule an appointment as soon as possible for a visit            Medication List        Changed      hydroCHLOROthiazide 25 MG tablet  Take 1 tablet by mouth daily.  What changed: Another medication with the same name was removed. Continue taking this medication, and follow the directions you see here.               Where to Get Your Medications        These medications were sent to [facility] [place] [address] [address] [facility] [address]      Hours: Monday to Friday 7 AM to 6 PM, Saturday & Sunday 8 AM to 4:30 PM (Closed 12 PM to 12:30 PM) Phone: [phone]   hydroCHLOROthiazide 25 MG tablet                  Latest Documented Vital Signs:  As of 11:15 EDT  BP- 154/98 HR- 74 Temp- 97.3 °F (36.3 °C) O2 sat- 100%              --    Please note that portions of this were completed with a voice recognition program.       Note Disclaimer: At [facility], we believe that sharing information builds trust and better relationships. You are receiving this note because you are receiving care at [facility] or recently visited. It is possible you will see health information before a provider has talked with you about it. This kind of information can be easy to misunderstand. To help you fully understand what it means for your health, we urge you to discuss this note with your provider.            Steve Amezquita MD  04/09/25 3942

## 2025-04-10 LAB
QT INTERVAL: 407 MS
QTC INTERVAL: 488 MS